# Patient Record
Sex: FEMALE | Race: BLACK OR AFRICAN AMERICAN | Employment: OTHER | URBAN - METROPOLITAN AREA
[De-identification: names, ages, dates, MRNs, and addresses within clinical notes are randomized per-mention and may not be internally consistent; named-entity substitution may affect disease eponyms.]

---

## 2017-01-26 ENCOUNTER — HOSPITAL ENCOUNTER (OUTPATIENT)
Dept: GENERAL RADIOLOGY | Age: 77
Discharge: HOME | End: 2017-01-26
Payer: MEDICARE

## 2017-01-26 DIAGNOSIS — R05.9 COUGH: ICD-10-CM

## 2017-01-26 PROCEDURE — 71020 XR CHEST PA LAT: CPT

## 2017-04-17 RX ORDER — OMEPRAZOLE 20 MG/1
20 CAPSULE, DELAYED RELEASE ORAL DAILY
COMMUNITY

## 2017-04-20 ENCOUNTER — ANESTHESIA EVENT (OUTPATIENT)
Dept: ENDOSCOPY | Age: 77
End: 2017-04-20
Payer: MEDICARE

## 2017-04-21 ENCOUNTER — HOSPITAL ENCOUNTER (OUTPATIENT)
Age: 77
Setting detail: OUTPATIENT SURGERY
Discharge: HOME | End: 2017-04-21
Attending: INTERNAL MEDICINE | Admitting: INTERNAL MEDICINE
Payer: MEDICARE

## 2017-04-21 ENCOUNTER — ANESTHESIA (OUTPATIENT)
Dept: ENDOSCOPY | Age: 77
End: 2017-04-21
Payer: MEDICARE

## 2017-04-21 VITALS
TEMPERATURE: 97.8 F | SYSTOLIC BLOOD PRESSURE: 107 MMHG | HEIGHT: 64 IN | HEART RATE: 62 BPM | DIASTOLIC BLOOD PRESSURE: 55 MMHG | WEIGHT: 162 LBS | BODY MASS INDEX: 27.66 KG/M2 | OXYGEN SATURATION: 95 % | RESPIRATION RATE: 16 BRPM

## 2017-04-21 PROCEDURE — 74011250636 HC RX REV CODE- 250/636

## 2017-04-21 PROCEDURE — 74011250636 HC RX REV CODE- 250/636: Performed by: NURSE ANESTHETIST, CERTIFIED REGISTERED

## 2017-04-21 PROCEDURE — 76060000031 HC ANESTHESIA FIRST 0.5 HR: Performed by: INTERNAL MEDICINE

## 2017-04-21 PROCEDURE — 77030031670 HC DEV INFL ENDOTEK BIG60 MRTM -B: Performed by: INTERNAL MEDICINE

## 2017-04-21 PROCEDURE — 76040000019: Performed by: INTERNAL MEDICINE

## 2017-04-21 RX ORDER — DEXTROMETHORPHAN/PSEUDOEPHED 2.5-7.5/.8
1.2 DROPS ORAL
Status: DISCONTINUED | OUTPATIENT
Start: 2017-04-21 | End: 2017-04-21 | Stop reason: HOSPADM

## 2017-04-21 RX ORDER — SODIUM CHLORIDE 0.9 % (FLUSH) 0.9 %
5-10 SYRINGE (ML) INJECTION AS NEEDED
Status: DISCONTINUED | OUTPATIENT
Start: 2017-04-21 | End: 2017-04-21 | Stop reason: HOSPADM

## 2017-04-21 RX ORDER — SODIUM CHLORIDE, SODIUM LACTATE, POTASSIUM CHLORIDE, CALCIUM CHLORIDE 600; 310; 30; 20 MG/100ML; MG/100ML; MG/100ML; MG/100ML
75 INJECTION, SOLUTION INTRAVENOUS CONTINUOUS
Status: DISCONTINUED | OUTPATIENT
Start: 2017-04-21 | End: 2017-04-21 | Stop reason: HOSPADM

## 2017-04-21 RX ORDER — SODIUM CHLORIDE 0.9 % (FLUSH) 0.9 %
5-10 SYRINGE (ML) INJECTION EVERY 8 HOURS
Status: DISCONTINUED | OUTPATIENT
Start: 2017-04-21 | End: 2017-04-21 | Stop reason: HOSPADM

## 2017-04-21 RX ORDER — LIDOCAINE HYDROCHLORIDE 10 MG/ML
0.1 INJECTION, SOLUTION EPIDURAL; INFILTRATION; INTRACAUDAL; PERINEURAL AS NEEDED
Status: DISCONTINUED | OUTPATIENT
Start: 2017-04-21 | End: 2017-04-21 | Stop reason: HOSPADM

## 2017-04-21 RX ORDER — PROPOFOL 10 MG/ML
INJECTION, EMULSION INTRAVENOUS AS NEEDED
Status: DISCONTINUED | OUTPATIENT
Start: 2017-04-21 | End: 2017-04-21 | Stop reason: HOSPADM

## 2017-04-21 RX ADMIN — PROPOFOL 50 MG: 10 INJECTION, EMULSION INTRAVENOUS at 09:11

## 2017-04-21 RX ADMIN — PROPOFOL 10 MG: 10 INJECTION, EMULSION INTRAVENOUS at 09:17

## 2017-04-21 RX ADMIN — PROPOFOL 50 MG: 10 INJECTION, EMULSION INTRAVENOUS at 09:10

## 2017-04-21 RX ADMIN — SODIUM CHLORIDE, SODIUM LACTATE, POTASSIUM CHLORIDE, AND CALCIUM CHLORIDE 75 ML/HR: 600; 310; 30; 20 INJECTION, SOLUTION INTRAVENOUS at 08:39

## 2017-04-21 NOTE — H&P
Date of Surgery Update:  Camryn Arriaga was seen and examined. History and physical has been reviewed. The patient has been examined.  There have been no significant clinical changes since the completion of the originally dated History and Physical.    Signed By: Sosa Taylor MD     April 21, 2017 7:54 AM

## 2017-04-21 NOTE — DISCHARGE INSTRUCTIONS
Patient Discharge Instructions    Aida Johnson / [id number] : 1940    Admitted 2017 Discharged: 2017         Procedure Impression:  1. Diverticulosis with no diverticulitis or bleeding. 2. Otherwise normal colonoscopy including terminal ileum. Recommendation:  1. Resume regular diet, recommend high fiber  2. Repeat colonoscopy in 10 years if appropriate based upon your health status at that time. Recommended Diet: Regular Diet    Recommended Activity:    1. Do not drink alcohol, drive or operate machinery for 12 hours   2. Call if any fever, abdominal pain or bleeding noted. Signed By: Pooja Dumont MD     2017            Colonoscopy: What to Expect at [address] [place] [address]  After you have a colonoscopy, you will stay at the clinic for 1 to 2 hours until the medicines wear off. Then you can go home. But you will need to arrange for a ride. Your doctor will tell you when you can eat and do your other usual activities. Your doctor will talk to you about when you will need your next colonoscopy. Your doctor can help you decide how often you need to be checked. This will depend on the results of your test and your risk for colorectal cancer. After the test, you may be bloated or have gas pains. You may need to pass gas. If a biopsy was done or a polyp was removed, you may have streaks of blood in your stool (feces) for a few days. This care sheet gives you a general idea about how long it will take for you to recover. But each person recovers at a different pace. Follow the steps below to get better as quickly as possible. How can you care for yourself at home? Activity  · Rest when you feel tired. · You can do your normal activities when it feels okay to do so. Diet  · Follow your doctor's directions for eating. · Unless your doctor has told you not to, drink plenty of fluids. This helps to replace the fluids that were lost during the colon prep.   · Do not drink alcohol. Medicines  · Your doctor will tell you if and when you can restart your medicines. He or she will also give you instructions about taking any new medicines. · If you take blood thinners, such as warfarin (Coumadin), clopidogrel (Plavix), or aspirin, be sure to talk to your doctor. He or she will tell you if and when to start taking those medicines again. Make sure that you understand exactly what your doctor wants you to do. · If polyps were removed or a biopsy was done during the test, your doctor may tell you not to take aspirin or other anti-inflammatory medicines for a few days. These include ibuprofen (Advil, Motrin) and naproxen (Aleve). Other instructions  · For your safety, do not drive or operate machinery until the medicine wears off and you can think clearly. Your doctor may tell you not to drive or operate machinery until the day after your test.  · Do not sign legal documents or make major decisions until the medicine wears off and you can think clearly. The anesthesia can make it hard for you to fully understand what you are agreeing to. Follow-up care is a key part of your treatment and safety. Be sure to make and go to all appointments, and call your doctor if you are having problems. It's also a good idea to know your test results and keep a list of the medicines you take. When should you call for help? Call 911 anytime you think you may need emergency care. For example, call if:  · You passed out (lost consciousness). · You pass maroon or bloody stools. · You have severe belly pain. Call your doctor now or seek immediate medical care if:  · Your stools are black and tarlike. · Your stools have streaks of blood, but you did not have a biopsy or any polyps removed. · You have belly pain, or your belly is swollen and firm. · You vomit. · You have a fever. · You are very dizzy.   Watch closely for changes in your health, and be sure to contact your doctor if you have any problems. Where can you learn more? Go to http://aruna-angelo.info/. Enter E264 in the search box to learn more about \"Colonoscopy: What to Expect at Home. \"  Current as of: August 9, 2016  Content Version: 11.2  © 9103-9728 [employer]. Care instructions adapted under license by [employer] (which disclaims liability or warranty for this information). If you have questions about a medical condition or this instruction, always ask your healthcare professional. [place] [address] any warranty or liability for your use of this information. DISCHARGE SUMMARY from Nurse    The following personal items are in your possession at time of discharge:    Dental Appliances: Uppers  Visual Aid: Glasses                            PATIENT INSTRUCTIONS:    After general anesthesia or intravenous sedation, for 24 hours or while taking prescription Narcotics:  · Limit your activities  · Do not drive and operate hazardous machinery  · Do not make important personal or business decisions  · Do  not drink alcoholic beverages  · If you have not urinated within 8 hours after discharge, please contact your surgeon on call. Report the following to your surgeon:  · Excessive pain, swelling, redness or odor of or around the surgical area  · Temperature over 100.5  · Nausea and vomiting lasting longer than 4 hours or if unable to take medications  · Any signs of decreased circulation or nerve impairment to extremity: change in color, persistent  numbness, tingling, coldness or increase pain  · Any questions        What to do at Home:  Recommended activity:        These are general instructions for a healthy lifestyle:    No smoking/ No tobacco products/ Avoid exposure to second hand smoke    Surgeon General's Warning:  Quitting smoking now greatly reduces serious risk to your health.     Obesity, smoking, and sedentary lifestyle greatly increases your risk for illness    A healthy diet, regular physical exercise & weight monitoring are important for maintaining a healthy lifestyle    You may be retaining fluid if you have a history of heart failure or if you experience any of the following symptoms:  Weight gain of 3 pounds or more overnight or 5 pounds in a week, increased swelling in our hands or feet or shortness of breath while lying flat in bed. Please call your doctor as soon as you notice any of these symptoms; do not wait until your next office visit. Recognize signs and symptoms of STROKE:    F-face looks uneven    A-arms unable to move or move unevenly    S-speech slurred or non-existent    T-time-call 911 as soon as signs and symptoms begin-DO NOT go       Back to bed or wait to see if you get better-TIME IS BRAIN. Warning Signs of HEART ATTACK     Call 911 if you have these symptoms:   Chest discomfort. Most heart attacks involve discomfort in the center of the chest that lasts more than a few minutes, or that goes away and comes back. It can feel like uncomfortable pressure, squeezing, fullness, or pain.  Discomfort in other areas of the upper body. Symptoms can include pain or discomfort in one or both arms, the back, neck, jaw, or stomach.  Shortness of breath with or without chest discomfort.  Other signs may include breaking out in a cold sweat, nausea, or lightheadedness. Don't wait more than five minutes to call 911 - MINUTES MATTER! Fast action can save your life. Calling 911 is almost always the fastest way to get lifesaving treatment. Emergency Medical Services staff can begin treatment when they arrive -- up to an hour sooner than if someone gets to the hospital by car. The discharge information has been reviewed with the caregiver. The caregiver verbalized understanding. Discharge medications reviewed with the caregiver and appropriate educational materials and side effects teaching were provided.       Patient armband removed and given to patient to take home.   Patient was informed of the privacy risks if armband lost or stolen

## 2017-04-21 NOTE — IP AVS SNAPSHOT
[address] [place] [address] [address] 
[phone] Patient: Sidney Tsai MRN: [id number] VB You are allergic to the following Allergen Reactions Neuromuscular Blockers, Steroidal Itching Recent Documentation Height Weight Breastfeeding? BMI OB Status Smoking Status 1.626 m 73.5 kg No 27.81 kg/m2 Hysterectomy Never Smoker Emergency Contacts Name Discharge Info Relation Home Work Mobile Stefanie Wang  Daughter [21]   [phone] Shweta Carlton-Daughter In Law DISCHARGE CAREGIVER [3] Other Relative [6]   [phone] About your hospitalization You were admitted on:  2017 You last received care in the:  [facility] PHASE 2 RECOVERY You were discharged on:  2017 Unit phone number:  [phone] Why you were hospitalized Your primary diagnosis was:  Not on File Providers Seen During Your Hospitalizations Provider Role Specialty Primary office phone Raymundo Tsang MD Attending Provider Gastroenterology [phone] Your Primary Care Physician (PCP) Primary Care Physician Office Phone Office Fax Martin Plascencia [phone] Follow-up Information Follow up With Details Comments Contact Info Mai Reeder MD   [address] [place] [address] 
[phone] Current Discharge Medication List  
  
CONTINUE these medications which have NOT CHANGED Dose & Instructions Dispensing Information Comments Morning Noon Evening Bedtime  
 aspirin 81 mg chewable tablet Your last dose was: Your next dose is:    
   
   
 Dose:  81 mg Take 81 mg by mouth daily. Refills:  0 BENICAR HCT 40-12.5 mg per tablet Generic drug:  olmesartan-hydroCHLOROthiazide Your last dose was: Your next dose is:    
   
   
  Refills:  0 cod liver oil Oil Your last dose was: Your next dose is: Take  by mouth. Refills:  0  
     
   
   
   
  
 multivitamin tablet Commonly known as:  ONE A DAY Your last dose was: Your next dose is:    
   
   
 Dose:  1 Tab Take 1 tablet by mouth daily. Refills:  0  
     
   
   
   
  
 omeprazole 20 mg capsule Commonly known as:  PRILOSEC Your last dose was: Your next dose is:    
   
   
 Dose:  20 mg Take 20 mg by mouth daily. Refills:  0  
     
   
   
   
  
 simvastatin 40 mg tablet Commonly known as:  ZOCOR Your last dose was: Your next dose is:    
   
   
  Refills:  0 Discharge Instructions Patient Discharge Instructions Roger Moser / [id number] : 1940 Admitted 2017 Discharged: 2017 Procedure Impression: 1. Diverticulosis with no diverticulitis or bleeding. 2. Otherwise normal colonoscopy including terminal ileum. Recommendation: 1. Resume regular diet, recommend high fiber 2. Repeat colonoscopy in 10 years if appropriate based upon your health status at that time. Recommended Diet: Regular Diet Recommended Activity: 1. Do not drink alcohol, drive or operate machinery for 12 hours 2. Call if any fever, abdominal pain or bleeding noted. Signed By: Deacon Powers MD   
 2017 Colonoscopy: What to Expect at [facility] Your Recovery After you have a colonoscopy, you will stay at the clinic for 1 to 2 hours until the medicines wear off. Then you can go home. But you will need to arrange for a ride. Your doctor will tell you when you can eat and do your other usual activities. Your doctor will talk to you about when you will need your next colonoscopy. Your doctor can help you decide how often you need to be checked.  This will depend on the results of your test and your risk for colorectal cancer. After the test, you may be bloated or have gas pains. You may need to pass gas. If a biopsy was done or a polyp was removed, you may have streaks of blood in your stool (feces) for a few days. This care sheet gives you a general idea about how long it will take for you to recover. But each person recovers at a different pace. Follow the steps below to get better as quickly as possible. How can you care for yourself at home? Activity · Rest when you feel tired. · You can do your normal activities when it feels okay to do so. Diet · Follow your doctor's directions for eating. · Unless your doctor has told you not to, drink plenty of fluids. This helps to replace the fluids that were lost during the colon prep. · Do not drink alcohol. Medicines · Your doctor will tell you if and when you can restart your medicines. He or she will also give you instructions about taking any new medicines. · If you take blood thinners, such as warfarin (Coumadin), clopidogrel (Plavix), or aspirin, be sure to talk to your doctor. He or she will tell you if and when to start taking those medicines again. Make sure that you understand exactly what your doctor wants you to do. · If polyps were removed or a biopsy was done during the test, your doctor may tell you not to take aspirin or other anti-inflammatory medicines for a few days. These include ibuprofen (Advil, Motrin) and naproxen (Aleve). Other instructions · For your safety, do not drive or operate machinery until the medicine wears off and you can think clearly. Your doctor may tell you not to drive or operate machinery until the day after your test. 
· Do not sign legal documents or make major decisions until the medicine wears off and you can think clearly. The anesthesia can make it hard for you to fully understand what you are agreeing to. Follow-up care is a key part of your treatment and safety.  Be sure to make and go to all appointments, and call your doctor if you are having problems. It's also a good idea to know your test results and keep a list of the medicines you take. When should you call for help? Call 911 anytime you think you may need emergency care. For example, call if: 
· You passed out (lost consciousness). · You pass maroon or bloody stools. · You have severe belly pain. Call your doctor now or seek immediate medical care if: 
· Your stools are black and tarlike. · Your stools have streaks of blood, but you did not have a biopsy or any polyps removed. · You have belly pain, or your belly is swollen and firm. · You vomit. · You have a fever. · You are very dizzy. Watch closely for changes in your health, and be sure to contact your doctor if you have any problems. Where can you learn more? Go to http://aruna-angelo.info/. Enter E264 in the search box to learn more about \"Colonoscopy: What to Expect at Home. \" Current as of: August 9, 2016 Content Version: 11.2 © 9873-1907 [employer]. Care instructions adapted under license by [employer] (which disclaims liability or warranty for this information). If you have questions about a medical condition or this instruction, always ask your healthcare professional. [place] [address] any warranty or liability for your use of this information. DISCHARGE SUMMARY from Nurse The following personal items are in your possession at time of discharge: 
 
Dental Appliances: Uppers Visual Aid: Glasses PATIENT INSTRUCTIONS: 
 
After general anesthesia or intravenous sedation, for 24 hours or while taking prescription Narcotics: · Limit your activities · Do not drive and operate hazardous machinery · Do not make important personal or business decisions · Do  not drink alcoholic beverages · If you have not urinated within 8 hours after discharge, please contact your surgeon on call. Report the following to your surgeon: 
· Excessive pain, swelling, redness or odor of or around the surgical area · Temperature over 100.5 · Nausea and vomiting lasting longer than 4 hours or if unable to take medications · Any signs of decreased circulation or nerve impairment to extremity: change in color, persistent  numbness, tingling, coldness or increase pain · Any questions What to do at Home: 
Recommended activity: 
 
 
 
These are general instructions for a healthy lifestyle: No smoking/ No tobacco products/ Avoid exposure to second hand smoke Surgeon General's Warning:  Quitting smoking now greatly reduces serious risk to your health. Obesity, smoking, and sedentary lifestyle greatly increases your risk for illness A healthy diet, regular physical exercise & weight monitoring are important for maintaining a healthy lifestyle You may be retaining fluid if you have a history of heart failure or if you experience any of the following symptoms:  Weight gain of 3 pounds or more overnight or 5 pounds in a week, increased swelling in our hands or feet or shortness of breath while lying flat in bed. Please call your doctor as soon as you notice any of these symptoms; do not wait until your next office visit. Recognize signs and symptoms of STROKE: 
 
F-face looks uneven A-arms unable to move or move unevenly S-speech slurred or non-existent T-time-call 911 as soon as signs and symptoms begin-DO NOT go Back to bed or wait to see if you get better-TIME IS BRAIN. Warning Signs of HEART ATTACK Call 911 if you have these symptoms: 
? Chest discomfort. Most heart attacks involve discomfort in the center of the chest that lasts more than a few minutes, or that goes away and comes back. It can feel like uncomfortable pressure, squeezing, fullness, or pain. ? Discomfort in other areas of the upper body.  Symptoms can include pain or discomfort in one or both arms, the back, neck, jaw, or stomach. ? Shortness of breath with or without chest discomfort. ? Other signs may include breaking out in a cold sweat, nausea, or lightheadedness. Don't wait more than five minutes to call [address]! Fast action can save your life. Calling 911 is almost always the fastest way to get lifesaving treatment. Emergency Medical Services staff can begin treatment when they arrive  up to an hour sooner than if someone gets to the hospital by car. The discharge information has been reviewed with the caregiver. The caregiver verbalized understanding. Discharge medications reviewed with the caregiver and appropriate educational materials and side effects teaching were provided. Patient armband removed and given to patient to take home. Patient was informed of the privacy risks if armband lost or stolen Discharge Orders None Introducing [facility] SERVICES! Benji Crandall introduces Work[employer] patient portal. Now you can access parts of your medical record, email your doctor's office, and request medication refills online. 1. In your internet browser, go to [email or web address] 2. Click on the First Time User? Click Here link in the Sign In box. You will see the New Member Sign Up page. 3. Enter your Work[employer] Access Code exactly as it appears below. You will not need to use this code after youve completed the sign-up process. If you do not sign up before the expiration date, you must request a new code. · Work[employer] Access Code: [email or web address] Expires: 4/26/2017  9:58 AM 
 
4. Enter the last four digits of your Social Security Number (xxxx) and Date of Birth (mm/dd/yyyy) as indicated and click Submit. You will be taken to the next sign-up page. 5. Create a WorkSnug ID. This will be your Work[employer] login ID and cannot be changed, so think of one that is secure and easy to remember. 6. Create a [employer] password. You can change your password at any time. 7. Enter your Password Reset Question and Answer. This can be used at a later time if you forget your password. 8. Enter your e-mail address. You will receive e-mail notification when new information is available in [address] 9. Click Sign Up. You can now view and download portions of your medical record. 10. Click the Download Summary menu link to download a portable copy of your medical information. If you have questions, please visit the Frequently Asked Questions section of the [employer] website. Remember, [employer] is NOT to be used for urgent needs. For medical emergencies, dial 911. Now available from your iPhone and Android! General Information Please provide this summary of care documentation to your next provider. Patient Signature:  ____________________________________________________________ Date:  ____________________________________________________________  
  
Reba Desouza Provider Signature:  ____________________________________________________________ Date:  ____________________________________________________________

## 2017-04-21 NOTE — ANESTHESIA POSTPROCEDURE EVALUATION
Post-Anesthesia Evaluation & Assessment    Visit Vitals    BP (!) 84/51    Pulse 72    Temp 36.6 °C (97.8 °F)    Resp 16    Ht 5' 4\" (1.626 m)    Wt 73.5 kg (162 lb)    SpO2 94%    Breastfeeding No    BMI 27.81 kg/m2       Nausea/Vomiting: no nausea and no vomiting    Post-operative hydration adequate. Pain score (VAS): 0    Mental status & Level of consciousness: alert and oriented x 3    Neurological status: moves all extremities, sensation grossly intact    Pulmonary status: airway patent, no supplemental oxygen required    Complications related to anesthesia: none    Patient has met all discharge requirements.     Additional comments:        Michelle Moss CRNA

## 2017-04-21 NOTE — ANESTHESIA PREPROCEDURE EVALUATION
Anesthetic History   No history of anesthetic complications            Review of Systems / Medical History  Patient summary reviewed and pertinent labs reviewed    Pulmonary  Within defined limits                 Neuro/Psych   Within defined limits           Cardiovascular    Hypertension              Exercise tolerance: >4 METS     GI/Hepatic/Renal     GERD: well controlled           Endo/Other        Arthritis     Other Findings   Comments:   Risk Factors for Postoperative nausea/vomiting:       History of postoperative nausea/vomiting? NO       Female? YES       Motion sickness? NO       Intended opioid administration for postoperative analgesia?   NO           Physical Exam    Airway  Mallampati: II  TM Distance: 4 - 6 cm  Neck ROM: normal range of motion   Mouth opening: Normal     Cardiovascular               Dental  No notable dental hx       Pulmonary  Breath sounds clear to auscultation               Abdominal  GI exam deferred       Other Findings            Anesthetic Plan    ASA: 2  Anesthesia type: MAC          Induction: Intravenous  Anesthetic plan and risks discussed with: Patient

## 2017-04-21 NOTE — PROCEDURES
Colonoscopy Report    Patient: Kari Still MRN: [id number]  SSN: [id number]    YOB: 1940  Age: 65 y.o. Sex: female      Date of Procedure: 4/21/2017    IMPRESSION:  1. Diverticulosis with no diverticulitis or bleeding. 2. Otherwise normal colonoscopy including terminal ileum. RECOMMENDATIONS:  1. Resume regular diet, Recommend high fiber. 2. Repeat colonoscopy in 10 years if appropriate based upon health status at that time. Indication:  Screening colonoscopy  Procedure Performed: Colonoscopy   Endoscopist: Filomena Trujillo MD  Assistant: Endoscopy Technician-1: Gonzalo Champion  Endoscopy RN-1: Poppy Bourgeois RN  ASA: ASA 3 - Patient with moderate systemic disease with functional limitations  Mallampati Score: II (soft palate, uvula, fauces visible)  Anesthesia: MAC anesthesia  Endoscope:     [x]  CF H 190AL   []  PCF H190AL   []  GIF H 190    Extent of Examination:terminal ileum  Quality of Preparation:     []  Excellent   [x]  Very Good   [] Fair but adequate   [] Fair, inadequate   []  Poor      Technique: The procedure was discussed with the patient including risks, benefits, alternatives including risks of IV sedation, bleeding, perforation and missed polyp. A safety timeout was performed. The patient was given incremental doses of intravenous sedation to achieve moderate sedation. The patients vital signs were monitored at all times including heart rate, rhythm, blood pressure and oxygen saturation. The patient was placed in left lateral position. When adequate sedation was achieved a perianal inspection and a digital rectal exam were performed. Video colonoscope was introduced into the rectum and advanced under direct vision up to the terminal ileum. The cecum was identified by IC valve, appendiceal orifice and crows foot. With adequate insufflation and maneuvering of the withdrawing scope, the colonic mucosa was visualized carefully.  Retroflexion was performed in the rectum and the distal rectum visualized. The patient tolerated the procedure very well and was transferred to recovery area. Findings:  1. Normal rectal exam.   2. Normal terminal ileum with no ulceration, mass, stricture. 3. There were several small diverticula in the descending and sigmoid colon. No associated inflammation or bleeding. 4. The colonic mucosa was otherwise normal with no polyps, masses, ulcerations, strictures.        EBL:None  Specimen: * No specimens in log *    Gala Porter MD  April 21, 2017  9:25 AM

## 2017-12-20 ENCOUNTER — OFFICE VISIT (OUTPATIENT)
Dept: ORTHOPEDIC SURGERY | Age: 77
End: 2017-12-20

## 2017-12-20 VITALS
HEART RATE: 87 BPM | HEIGHT: 64 IN | SYSTOLIC BLOOD PRESSURE: 116 MMHG | OXYGEN SATURATION: 98 % | BODY MASS INDEX: 28.2 KG/M2 | TEMPERATURE: 98.1 F | WEIGHT: 165.2 LBS | DIASTOLIC BLOOD PRESSURE: 47 MMHG | RESPIRATION RATE: 16 BRPM

## 2017-12-20 DIAGNOSIS — M47.816 LUMBAR SPONDYLOSIS: Primary | ICD-10-CM

## 2017-12-20 DIAGNOSIS — M48.061 LUMBAR STENOSIS WITHOUT NEUROGENIC CLAUDICATION: ICD-10-CM

## 2017-12-20 PROBLEM — M51.36 DDD (DEGENERATIVE DISC DISEASE), LUMBAR: Status: ACTIVE | Noted: 2017-12-20

## 2017-12-20 RX ORDER — RANITIDINE 300 MG/1
TABLET ORAL
Refills: 0 | COMMUNITY
Start: 2017-11-06

## 2017-12-20 NOTE — PROGRESS NOTES
[address]  [address], [address]  [place]  Phone: [phone]  Fax: [phone]        Mahin Mckeon  : 1940  PCP: Cayetano Westbrook MD      NEW PATIENT      ASSESSMENT AND PLAN     Diagnoses and all orders for this visit:    1. Lumbar spondylosis  -     REFERRAL TO PHYSICAL THERAPY    2. Lumbar stenosis without neurogenic claudication  -     REFERRAL TO PHYSICAL THERAPY    1. Advised to stay active as tolerated. 2. Medications, injections, and physical therapy discussed as possible treatment options. 3. Referral to physical therapy. 4. Avoid any repetitive BLT. Follow-up Disposition:  Return in about 6 weeks (around 2018). CHIEF COMPLAINT  Bonita Leyva is seen today in consultation at the request of Dr. Rosana Morrow for complaints of back pain. HISTORY OF PRESENT ILLNESS  Bonita Leyva is a 65 y.o. female. Today pt c/o low back pain of 2 years duration. Pt denies any specific incident or injury that caused their pain. She states that she has had pain in her back for more than two years but has been increasing in severity for the past 2 years. Her pain is aggravated with vacuuming, sweeping, mopping, and other house chores. Pt reports pain does not radiate into BLE. She has had an episode of pain in her buttocks. Bending, pushing, pulling, and activity flares her pain. She has no pain with sitting. Pt denies weakness in BLe. Pt denies saddle paresthesias. Pt states she has used Aleve PRN with relief. she has tried; PT-No,  Non-opioid medications Yes, and spinal injections No.  Denies persistent fevers, chills, weight changes, neurogenic bowel or bladder symptoms. Pt denies recent ED visits or hospitalizations. PMHx of GERD. MRI + stenosis and possible left sacroiliitis.  Pt denies focal L sided LBP, falls, infection    Pain Assessment  2017   Location of Pain Back   Severity of Pain 7   Quality of Pain Aching   Frequency of Pain Constant   Aggravating Factors (No Data)   Aggravating Factors Comment household dddutiess   Relieving Factors Rest     Lumbar spine MRI from 11/1/17 reviewed:      Result Impression   Impression:    1. Degenerative disease at L4-L5 and L5-S1, with central stenosis , foraminal stenosis, spondylolisthesis and bilateral exiting L4 and L5 nerve root compression. 2. Questionable sacroiliitis, nonspecific or indeterminate age fracture through the left iliac wing not completely excluded. Clinical correlation? PAST MEDICAL HISTORY   Past Medical History:   Diagnosis Date    Arthritis     Chronic pain     LOWE BACK    GERD (gastroesophageal reflux disease)     Hypercholesterolemia     Hypertension        Past Surgical History:   Procedure Laterality Date    COLONOSCOPY N/A 4/21/2017    [facility]  2004    right     HX HYSTERECTOMY  1984       MEDICATIONS      Current Outpatient Prescriptions   Medication Sig Dispense Refill    raNITIdine (ZANTAC) 300 mg tablet TAKE 1 TABLET BY MOUTH AT BEDTIME  0    omeprazole (PRILOSEC) 20 mg capsule Take 20 mg by mouth daily.  simvastatin (ZOCOR) 40 mg tablet       multivitamin (ONE A DAY) tablet Take 1 tablet by mouth daily.  aspirin 81 mg chewable tablet Take 81 mg by mouth daily.  cod liver oil oil Take  by mouth.  BENICAR HCT 40-12.5 mg per tablet          ALLERGIES    Allergies   Allergen Reactions    Neuromuscular Blockers, Steroidal Itching          SOCIAL HISTORY    Social History     Social History    Marital status: SINGLE     Spouse name: N/A    Number of children: N/A    Years of education: N/A     Occupational History    Not on file.      Social History Main Topics    Smoking status: Never Smoker    Smokeless tobacco: Never Used    Alcohol use No    Drug use: No    Sexual activity: Not on file     Other Topics Concern    Not on file     Social History Narrative       FAMILY HISTORY    Family History   Problem Relation Age of Onset    Heart Attack Mother     Cancer Father          REVIEW OF SYSTEMS  Review of Systems   Constitutional: Negative for chills, fever and weight loss. Respiratory: Negative for shortness of breath. Cardiovascular: Negative for chest pain. Gastrointestinal: Negative for constipation. Negative for fecal incontinence   Genitourinary: Negative for dysuria. Negative for urinary incontinence   Musculoskeletal:        Per HPI   Skin: Negative for rash. Neurological: Negative for dizziness, tingling, tremors, focal weakness and headaches. Endo/Heme/Allergies: Does not bruise/bleed easily. Psychiatric/Behavioral: The patient does not have insomnia. PHYSICAL EXAMINATION  Visit Vitals    /47    Pulse 87    Temp 98.1 °F (36.7 °C) (Oral)    Resp 16    Ht 5' 4\" (1.626 m)    Wt 165 lb 3.2 oz (74.9 kg)    SpO2 98%    BMI 28.36 kg/m2          Accompanied by self. Constitutional:  Well developed, well nourished, in no acute distress. Psychiatric: Affect and mood are appropriate. Integumentary: No rashes or abrasions noted on exposed areas. Cardiovascular/Peripheral Vascular: Intact l pulses. No peripheral edema is noted. Lymphatic:  No evidence of lymphedema. No cervical lymphadenopathy. SPINE/MUSCULOSKELETAL EXAM    Lumbar spine:  No rash, ecchymosis, or gross obliquity. No fasciculations. No focal atrophy is noted. Good ROM. No pain with hip ROM. Lumbar region no TTP. No tenderness to palpation at the sciatic notch. SI joints non-tender. Trochanters non tender. MOTOR:         Hip Flex  Quads Hamstrings Ankle DF EHL Ankle PF   Right +4/5 +4/5 +4/5 +4/5 +4/5 +4/5   Left +4/5 +4/5 +4/5 +4/5 +4/5 +4/5     Straight Leg raise negative. Unable to do tandem gait. Mild back pain with Heel rise. Toe rise intact    Ambulation without assistive device. FWB.    Written by Rosina Soria, as dictated by Angelica Kennedy MD.    I, Dr. Angelica Kennedy MD, confirm that all documentation is accurate. Ms. Mayra Cowden may have a reminder for a \"due or due soon\" health maintenance. I have asked that she contact her primary care provider for follow-up on this health maintenance.

## 2017-12-20 NOTE — PATIENT INSTRUCTIONS
Back Care and Preventing Injuries: Care Instructions  Your Care Instructions    You can hurt your back doing many everyday activities: lifting a heavy box, bending down to garden, exercising at the gym, and even getting out of bed. But you can keep your back strong and healthy by doing some exercises. You also can follow a few tips for sitting, sleeping, and lifting to avoid hurting your back again. Talk to your doctor before you start an exercise program. Ask for help if you want to learn more about keeping your back healthy. Follow-up care is a key part of your treatment and safety. Be sure to make and go to all appointments, and call your doctor if you are having problems. It's also a good idea to know your test results and keep a list of the medicines you take. How can you care for yourself at home? · Stay at a healthy weight to avoid strain on your lower back. · Do not smoke. Smoking increases the risk of osteoporosis, which weakens the spine. If you need help quitting, talk to your doctor about stop-smoking programs and medicines. These can increase your chances of quitting for good. · Make sure you sleep in a position that maintains your back's normal curves and on a mattress that feels comfortable. Sleep on your side with a pillow between your knees, or sleep on your back with a pillow under your knees. These positions can reduce strain on your back. · When you get out of bed, lie on your side and bend both knees. Drop your feet over the edge of the bed as you push up with both arms. Scoot to the edge of the bed. Make sure your feet are in line with your rear end (buttocks), and then stand up. · If you must stand for a long time, put one foot on a stool, ledge, or box. Exercise to strengthen your back and other muscles  · Get at least 30 minutes of exercise on most days of the week. Walking is a good choice.  You also may want to do other activities, such as running, swimming, cycling, or playing tennis or team sports. · Stretch your back muscles. Here are few exercises to try:  Hugo Valerio on your back with your knees bent and your feet flat on the floor. Gently pull one bent knee to your chest. Put that foot back on the floor, and then pull the other knee to your chest. Hold for 15 to 30 seconds. Repeat 2 to 4 times. ¨ Do pelvic tilts. Lie on your back with your knees bent. Tighten your stomach muscles. Pull your belly button (navel) in and up toward your ribs. You should feel like your back is pressing to the floor and your hips and pelvis are slightly lifting off the floor. Hold for 6 seconds while breathing smoothly. · Keep your core muscles strong. The muscles of your back, belly (abdomen), and buttocks support your spine. ¨ Pull in your belly, and imagine pulling your navel toward your spine. Hold this for 6 seconds, then relax. Remember to keep breathing normally as you tense your muscles. ¨ Do curl-ups. Always do them with your knees bent. Keep your low back on the floor, and curl your shoulders toward your knees using a smooth, slow motion. Keep your arms folded across your chest. If this bothers your neck, try putting your hands behind your neck (not your head), with your elbows spread apart. ¨ Lie on your back with your knees bent and your feet flat on the floor. Tighten your belly muscles, and then push with your feet and raise your buttocks up a few inches. Hold this position 6 seconds as you continue to breathe normally, then lower yourself slowly to the floor. Repeat 8 to 12 times. ¨ If you like group exercise, try Pilates or yoga. These classes have poses that strengthen the core muscles. Protect your back when you sit  · Place a small pillow, a rolled-up towel, or a lumbar roll in the curve of your back if you need extra support. · Sit in a chair that is low enough to let you place both feet flat on the floor with both knees nearly level with your hips.  If your chair or desk is too high, use a foot rest to raise your knees. · When driving, keep your knees nearly level with your hips. Sit straight, and drive with both hands on the steering wheel. Your arms should be in a slightly bent position. · Try a kneeling chair, which helps tilt your hips forward. This takes pressure off your lower back. · Try sitting on an exercise ball. It can rock from side to side, which helps keep your back loose. Lift properly  · Squat down, bending at the hips and knees only. If you need to, put one knee to the floor and extend your other knee in front of you, bent at a right angle (half kneeling). · Press your chest straight forward. This helps keep your upper back straight while keeping a slight arch in your low back. · Hold the load as close to your body as possible, at the level of your navel. · Use your feet to change direction, taking small steps. · Lead with your hips as you change direction. Keep your shoulders in line with your hips as you move. Do not twist your body. · Set down your load carefully, squatting with your knees and hips only. When should you call for help? Watch closely for changes in your health, and be sure to contact your doctor if you have any problems. Where can you learn more? Go to http://aruna-angelo.info/. Enter S810 in the search box to learn more about \"Back Care and Preventing Injuries: Care Instructions. \"  Current as of: March 21, 2017  Content Version: 11.4  © 5363-0378 [employer]. Care instructions adapted under license by [employer] (which disclaims liability or warranty for this information). If you have questions about a medical condition or this instruction, always ask your healthcare professional. [place] [address] any warranty or liability for your use of this information.

## 2017-12-20 NOTE — MR AVS SNAPSHOT
Visit Information Date & Time Provider Department Dept. Phone Encounter #  
 12/20/2017  1:30  [address],  [facility], [address] and [facility] [phone] [id number] Follow-up Instructions Return in about 6 weeks (around 1/31/2018). Upcoming Health Maintenance Date Due DTaP/Tdap/Td series (1 - Tdap) 4/11/1960 ZOSTER VACCINE AGE 60> 2/11/1999 GLAUCOMA SCREENING Q2Y 4/11/2004 Pneumococcal 65+ Low/Medium Risk (1 of 2 - PCV13) 4/11/2004 MEDICARE YEARLY EXAM 4/11/2004 Influenza Age 5 to Adult 8/1/2017 Allergies as of 12/20/2017  Review Complete On: 12/20/2017 By: Sangita Cobb LPN Severity Noted Reaction Type Reactions Neuromuscular Blockers, Steroidal  05/28/2014    Itching Current Immunizations  Never Reviewed No immunizations on file. Not reviewed this visit You Were Diagnosed With   
  
 Codes Comments Lumbar spondylosis    -  Primary ICD-10-CM: T25.550 ICD-9-CM: 721.3 DDD (degenerative disc disease), lumbar     ICD-10-CM: M51.36 
ICD-9-CM: 722.52 Vitals BP Pulse Temp Resp Height(growth percentile) Weight(growth percentile) 116/47 87 98.1 °F (36.7 °C) (Oral) 16 5' 4\" (1.626 m) 165 lb 3.2 oz (74.9 kg) SpO2 BMI OB Status Smoking Status 98% 28.36 kg/m2 Hysterectomy Never Smoker BMI and BSA Data Body Mass Index Body Surface Area  
 28.36 kg/m 2 1.84 m 2 Your Updated Medication List  
  
   
This list is accurate as of: 12/20/17  2:05 PM.  Always use your most recent med list.  
  
  
  
  
 aspirin 81 mg chewable tablet Take 81 mg by mouth daily. BENICAR HCT 40-12.5 mg per tablet Generic drug:  olmesartan-hydroCHLOROthiazide  
  
 cod liver oil Oil Take  by mouth.  
  
 multivitamin tablet Commonly known as:  ONE A DAY Take 1 tablet by mouth daily. omeprazole 20 mg capsule Commonly known as:  PRILOSEC Take 20 mg by mouth daily. raNITIdine 300 mg tablet Commonly known as:  ZANTAC TAKE 1 TABLET BY MOUTH AT BEDTIME  
  
 simvastatin 40 mg tablet Commonly known as:  ZOCOR We Performed the Following REFERRAL TO PHYSICAL THERAPY [RGJ53 Custom] Comments: In [place] Follow-up Instructions Return in about 6 weeks (around 1/31/2018). Referral Information Referral ID Referred By Referred To  
  
 [id number] Kris Godoy Not Available Visits Status Start Date End Date 1 New Request 12/20/17 12/20/18 If your referral has a status of pending review or denied, additional information will be sent to support the outcome of this decision. Patient Instructions Back Care and Preventing Injuries: Care Instructions Your Care Instructions You can hurt your back doing many everyday activities: lifting a heavy box, bending down to garden, exercising at the gym, and even getting out of bed. But you can keep your back strong and healthy by doing some exercises. You also can follow a few tips for sitting, sleeping, and lifting to avoid hurting your back again. Talk to your doctor before you start an exercise program. Ask for help if you want to learn more about keeping your back healthy. Follow-up care is a key part of your treatment and safety. Be sure to make and go to all appointments, and call your doctor if you are having problems. It's also a good idea to know your test results and keep a list of the medicines you take. How can you care for yourself at home? · Stay at a healthy weight to avoid strain on your lower back. · Do not smoke. Smoking increases the risk of osteoporosis, which weakens the spine. If you need help quitting, talk to your doctor about stop-smoking programs and medicines. These can increase your chances of quitting for good.  
· Make sure you sleep in a position that maintains your back's normal curves and on a mattress that feels comfortable. Sleep on your side with a pillow between your knees, or sleep on your back with a pillow under your knees. These positions can reduce strain on your back. · When you get out of bed, lie on your side and bend both knees. Drop your feet over the edge of the bed as you push up with both arms. Scoot to the edge of the bed. Make sure your feet are in line with your rear end (buttocks), and then stand up. · If you must stand for a long time, put one foot on a stool, ledge, or box. Exercise to strengthen your back and other muscles · Get at least 30 minutes of exercise on most days of the week. Walking is a good choice. You also may want to do other activities, such as running, swimming, cycling, or playing tennis or team sports. · Stretch your back muscles. Here are few exercises to try: ¨ Lie on your back with your knees bent and your feet flat on the floor. Gently pull one bent knee to your chest. Put that foot back on the floor, and then pull the other knee to your chest. Hold for 15 to 30 seconds. Repeat 2 to 4 times. ¨ Do pelvic tilts. Lie on your back with your knees bent. Tighten your stomach muscles. Pull your belly button (navel) in and up toward your ribs. You should feel like your back is pressing to the floor and your hips and pelvis are slightly lifting off the floor. Hold for 6 seconds while breathing smoothly. · Keep your core muscles strong. The muscles of your back, belly (abdomen), and buttocks support your spine. ¨ Pull in your belly, and imagine pulling your navel toward your spine. Hold this for 6 seconds, then relax. Remember to keep breathing normally as you tense your muscles. ¨ Do curl-ups. Always do them with your knees bent. Keep your low back on the floor, and curl your shoulders toward your knees using a smooth, slow motion.  Keep your arms folded across your chest. If this bothers your neck, try putting your hands behind your neck (not your head), with your elbows spread apart. ¨ Lie on your back with your knees bent and your feet flat on the floor. Tighten your belly muscles, and then push with your feet and raise your buttocks up a few inches. Hold this position 6 seconds as you continue to breathe normally, then lower yourself slowly to the floor. Repeat 8 to 12 times. ¨ If you like group exercise, try Pilates or yoga. These classes have poses that strengthen the core muscles. Protect your back when you sit · Place a small pillow, a rolled-up towel, or a lumbar roll in the curve of your back if you need extra support. · Sit in a chair that is low enough to let you place both feet flat on the floor with both knees nearly level with your hips. If your chair or desk is too high, use a foot rest to raise your knees. · When driving, keep your knees nearly level with your hips. Sit straight, and drive with both hands on the steering wheel. Your arms should be in a slightly bent position. · Try a kneeling chair, which helps tilt your hips forward. This takes pressure off your lower back. · Try sitting on an exercise ball. It can rock from side to side, which helps keep your back loose. Lift properly · Squat down, bending at the hips and knees only. If you need to, put one knee to the floor and extend your other knee in front of you, bent at a right angle (half kneeling). · Press your chest straight forward. This helps keep your upper back straight while keeping a slight arch in your low back. · Hold the load as close to your body as possible, at the level of your navel. · Use your feet to change direction, taking small steps. · Lead with your hips as you change direction. Keep your shoulders in line with your hips as you move. Do not twist your body. · Set down your load carefully, squatting with your knees and hips only. When should you call for help? Watch closely for changes in your health, and be sure to contact your doctor if you have any problems. Where can you learn more? Go to http://aruna-angelo.info/. Enter S810 in the search box to learn more about \"Back Care and Preventing Injuries: Care Instructions. \" Current as of: March 21, 2017 Content Version: 11.4 © 0416-5768 [employer]. Care instructions adapted under license by [employer] (which disclaims liability or warranty for this information). If you have questions about a medical condition or this instruction, always ask your healthcare professional. [address] any warranty or liability for your use of this information. Introducing \[facility] [place] [facility]\"" & HEALTH SERVICES! Kosta Gomez introduces [employer] patient portal. Now you can access parts of your medical record, email your doctor's office, and request medication refills online. 1. In your internet browser, go to [email or web address] [email or web address] 2. Click on the First Time User? Click Here link in the Sign In box. You will see the New Member Sign Up page. 3. Enter your [employer] Access Code exactly as it appears below. You will not need to use this code after youve completed the sign-up process. If you do not sign up before the expiration date, you must request a new code. · [employer] Access Code: [email or web address] Expires: 3/20/2018  2:05 PM 
 
4. Enter the last four digits of your Social Security Number (xxxx) and Date of Birth (mm/dd/yyyy) as indicated and click Submit. You will be taken to the next sign-up page. 5. Create a Eq[employer]xt ID. This will be your [employer] login ID and cannot be changed, so think of one that is secure and easy to remember. 6. Create a [employer] password. You can change your password at any time. 7. Enter your Password Reset Question and Answer. This can be used at a later time if you forget your password. 8. Enter your e-mail address. You will receive e-mail notification when new information is available in [address] 9. Click Sign Up. You can now view and download portions of your medical record. 10. Click the Download Summary menu link to download a portable copy of your medical information. If you have questions, please visit the Frequently Asked Questions section of the [facility] website. Remember, [facility] is NOT to be used for urgent needs. For medical emergencies, dial 911. Now available from your iPhone and Android! Please provide this summary of care documentation to your next provider. Your primary care clinician is listed as Paz Mcelroy. If you have any questions after today's visit, please call [phone].

## 2018-01-11 ENCOUNTER — HOSPITAL ENCOUNTER (OUTPATIENT)
Dept: PHYSICAL THERAPY | Age: 78
End: 2018-01-11
Payer: MEDICARE

## 2018-01-16 ENCOUNTER — HOSPITAL ENCOUNTER (OUTPATIENT)
Dept: PHYSICAL THERAPY | Age: 78
Discharge: HOME | End: 2018-01-16
Payer: MEDICARE

## 2018-01-16 PROCEDURE — 97162 PT EVAL MOD COMPLEX 30 MIN: CPT

## 2018-01-16 PROCEDURE — G8978 MOBILITY CURRENT STATUS: HCPCS

## 2018-01-16 PROCEDURE — 97110 THERAPEUTIC EXERCISES: CPT

## 2018-01-16 PROCEDURE — G8979 MOBILITY GOAL STATUS: HCPCS

## 2018-01-16 NOTE — PROGRESS NOTES
PHYSICAL THERAPY - DAILY TREATMENT NOTE    Patient Name: Kita Baltazar        Date: 2018  : 1940   YES Patient  Verified  Visit #:   1     Insurance: Payor: Kateryna  / Plan: [address] [place] [address] HMO / Product Type: Managed Care Medicare /      In time: 2:23 pm Out time: 3:05pm   Total Treatment Time: 42     Medicare Time Tracking (below)   Total Timed Codes (min):  12 1:1 Treatment Time:  12     TREATMENT AREA =  Lumbar spondylosis [M47.816]  Spinal stenosis, lumbar region without neurogenic claudication [M48.061]  SUBJECTIVE  Pain Level (on 0 to 10 scale):  3  / 10   Medication Changes/New allergies or changes in medical history, any new surgeries or procedures? NO    If yes, update Summary List   Subjective Functional Status/Changes:  []  No changes reported     See POC         OBJECTIVE  Modalities Rationale:     N/A    12 min Therapeutic Exercise:  [x]  See flow sheet FOTO Questionnaire    Rationale:      increase ROM and increase strength to improve the patients ability to perform ADLs. min Patient Education:  YES  Reviewed HEP   []  Progressed/Changed HEP based on: Other Objective/Functional Measures:   Physical Therapy Evaluation - Lumbar Spine ([facility])    SUBJECTIVE  Aggravated by:   [x] Bending [x] Sitting [] Standing [x] Walking (45 min)   [x] Moving [] Cough [] Sneeze [] Valsalva   [] AM  [] PM  Lying:  [x] sup   [] pro   [] sidelying   [] Other: Incr LBP with skipping bowel movement, prolonged driving     Eased by:    [] Bending [] Sitting [] Standing [] Walking   [] Moving [] AM  [] PM  Lying: [] sup  [] pro  [x] sidelying   [] Other:     General Health:  Red Flags Indicated? [] Yes    [] No  [] Yes [x] No Recent weight change (If yes, due to dieting?  [] Yes  [] No)   [] Yes [x] No Weakness in legs during walking  [] Yes [x] No Unremitting pain at night  [] Yes [x] No Abdominal pain or problems  [] Yes [x] No Rectal bleeding  [] Yes [x] No Blood or pain with urination  [] Yes [x] No Dysfunction of bowel or bladder  [] Yes [x] No Numbness/tingling in buttock/genitalia region    Past History/Treatments:     Diagnostic Tests: [] Lab work [x] X-rays    [] CT [x] MRI     [] Other:  Results: DDD and arthritis    OBJECTIVE  Posture:  Lateral Shift: [] R    [] L     [] +  [x] -  Kyphosis: [] Increased [x] Decreased   []  WNL  Lordosis:  [] Increased [] Decreased   [x] WNL    Active Movements: [] N/A   [] Too acute   [] Other:  ROM % AROM % PROM Comments:pain, area   Forward flexion 40-60 100%     Extension 20-30 50%  L LBP   SB right 20-30 75% jt line    SB left 20-30 75% jt line L LBP   Rotation right 5-10 75%  R LBP   Rotation left 5-10 100%       Neuro Screen [] WNL  Myotome/Dermatome/Reflexes: L2-S2 TAMY LE light touch sensation WNLs  Comments:    Dural Mobility:  SLR Supine: [] R    [] L    [] +    [x] -  @ (degrees):   Slump Test: [x] R    [x] L    [] +    [x] -  @ (degrees):   Prone Knee Bend: [] R    [] L    [] +    [x] -     Palpation  [x] Min  [] Mod  [] Severe    Location: L/S Paraspinals  [x] Min  [] Mod  [] Severe    Location: Quadratus Lumborum  [] Min  [x] Mod  [] Severe    Location: Piriformis    Strength   L(0-5) R (0-5) N/T   Psoas  (L1,2) 4 4+ []   Quadriceps (L3,4) 4 4- []   Ant Tibialis (L4) 5- 4+ []   Extensor Hallicus (L5) 5- 5    Gluteus Medius (L5) 2+ 3 []   Gastrocnemius (S1, S2) 3+ 3+ []   Hamstring (S1,2) 5- 5- []   Gluteus Adam (S1, S2) 3+ 3+ []   Supine Bridge 50% Fair []     Special Tests    Sacroilliac:  Distraction: [] R    [] L    [] +    [x] -     Compression: [] +    [x] -     Thigh Thrust: [] R    [] L    [] +    [x] -     Leg Length: [x] +    [] -   Position: R Ant     Mobility: Standing flex: (+R)     Supine to sit: R longer         Hip: Sangeeta Julio:  [x] R    [] L    [x] +    [] -     Piriformis: [x] R    [] L    [x] +    [] -          Deficits: Sylvia's: [] R    [] L    [] +    [x] -     Bonifacio: [] R    [] L    [] +    [x] - Hamstrings 90/90: L mod R slight to mod    Gastrocsoleus (to neutral): Right: neutral Left: < neutral    Other tests/comments: L4/5 hypermobility  Justification for Eval Complexity:   Patient History: MEDIUM  Complexity : 1-2 comorbidities / personal factors will impact the outcome/ POC  (HTN)  Examination:HIGH Complexity : 4+ Standardized tests and measures addressing body structure, function, activity limitation and / or participation in recreation  (See POC and musculoskeletal examination attached)  Clinical Presentation: MEDIUM Complexity : Evolving with changing characteristics  (pain level 7-8/10 on average and 10/10 @ worst, intermittent pain)  Clinical Decision Making:MEDIUM Complexity : FOTO score of 26-74 (Foto 55/100)  Overall Complexity:MEDIUM     Post Treatment Pain Level (on 0 to 10) scale:   0  / 10     ASSESSMENT  Assessment/Changes in Function:     See POC     []  See Progress Note/Recertification   Patient will continue to benefit from skilled PT services to modify and progress therapeutic interventions, address functional mobility deficits, address ROM deficits, address strength deficits, analyze and address soft tissue restrictions, analyze and cue movement patterns, analyze and modify body mechanics/ergonomics and assess and modify postural abnormalities to attain remaining goals.    Progress toward goals / Updated goals:    See POC     PLAN  [x]  Upgrade activities as tolerated YES Continue plan of care   []  Discharge due to :    []  Other:      Therapist: Dave Latif DPT     Date: 1/16/2018 Time: 8:57 AM     Future Appointments  Date Time Provider Orlin Ferrara   1/16/2018 2:30 PM [address]   1/31/2018 10:45 AM Barb Ramirez  [id number]

## 2018-01-16 NOTE — PROGRESS NOTES
[place] [address]  [facility] AT [address], [facility] [address] [facility], [facility] [place] [address] - Phone: [phone]  Fax: [phone] / [address] [facility]  Patient Name: Maureen Portillo : 1940   Medical   Diagnosis: Lumbar spondylosis [M47.816]  Spinal stenosis, lumbar region without neurogenic claudication [M48.061] Treatment Diagnosis: Lumbar spondylosis   Spinal stenosis, lumbar region without neurogenic claudication   Onset Date:      Referral Source: Dulce Mai MD [facility]): 2018   Prior Hospitalization: See medical history Provider #: [id number]   Prior Level of Function: Occasional LBP with forward bending   Comorbidities: HTN, Hypercholesterolemia, Acid Reflux, and Cataracts   Medications: Verified on Patient Summary List   The Plan of Care and following information is based on the information from the initial evaluation.   ==================================================================================  Assessment / key information: Patient  is a 65 y.o. female who [facility] to [facility] with diagnosis of Lumbar spondylosis [M47.816]  Spinal stenosis, lumbar region without neurogenic claudication [M48.061]. Patient reports LBP began over 2 years with insidious onset while vacuuming. Pain is located on TAMY sides of the lower back and described as an intermittent pressure. MRI showed arthritis and DDD of the lumbar spine. Pt denies numbness and tingling radiating down the TAMY LE. Pain level is rated at 3/10 at the best, 3/10 currently, and 10/10 at the worst. LBP increases with forward bending, sweeping, mopping, vacuuming, and squatting, decreases with lying in sidelying.  Upon objective evaluation, patient demonstrates CPA hypermobility and TTP of L4/5, R SI hypomobility, impaired and painful trunk AROM in extension, TAMY SB, and R rotation, decreased glute med (~3/5) and glute max (~3+/5) strength, decreased core and multifidus strength, and impaired flexibility of TAMY piriformis and hamstring musculature. L/S AROM is as follows: Flexion = 100%, Extension = 50% p!, R/L Sidebending = 75%/75%, R/L Rot = 75%/100. ALEXANDER, Stork, Forward flexion special tests are positive indicating SIJ dysfunction. Patient scored 54 on FOTO indicating decreased functional status and quality of life. Patient can benefit from skilled PT interventions to improve L/S ROM, flexibility, core strength, decrease pain and TTP and for education on posture, body mechanics and lifting mechanics/transfers to facilitate ADL's & overall functional status/quality of life.    ==================================================================================  Problem List: pain affecting function, decrease ROM, decrease strength, edema affecting function, impaired gait/ balance, decrease ADL/ functional abilities, decrease activity tolerance, decrease flexibility/ joint mobility and decrease transfer abilities   Treatment Plan may include any combination of the following: Therapeutic exercise, Therapeutic activities, Neuromuscular re-education, Physical agent/modality, dry needling, Gait/balance training, Manual therapy and Patient education  Patient / Family readiness to learn indicated by: asking questions, trying to perform skills and interest  Persons(s) to be included in education: patient (P)  Barriers to Learning/Limitations: Yes  Measures taken: Larger Font   Patient Goal (s): \"strengthen back to relieve and reduce pain\"   Patient self reported health status: good  Rehabilitation Potential: good   Short Term Goals: To be accomplished in 2  weeks:  1) Establish HEP. 2) Patient will report decreased c/o pain to < or = 8/10 at the worst to facilitate performance of household chores with manageable sx in L/S.  3) Patient will report 25% improvement in ability to vacuum and mop floors.   4) Patient be independent with SI self correct in order to improve pain and increase functional mobility.  Long Term Goals: To be accomplished in 6 weeks:  1) Patient independent with HEP and able to demo proper body mechanics for floor to chest lifting. 2) Patient will increase L/S ROM in flexion, TAMY SB, and TAMY Rot to >/= to 85% and pain free to increase ability to perform household chores. 3) Increase FOTO to 64 indicating improved function and quality of life. 4) Patient to perform 75% bridge indicating improved core strength to improve ambulation around the grocery store. 5) Patient to increase walking tolerance to 1 hour in order to improve ease with prolonged grocery shopping. 6) Patient able to maintain SI mobility or self correct to increase ability to perform extended sitting. Frequency / Duration:   Patient to be seen  1-2  times per week for 4-6  weeks:  Patient / Caregiver education and instruction: self care, activity modification and exercises  G-Codes (GP): Mobility [id number] Current  CK= 40-59%   Goal  CJ= 20-39%. The severity rating is based on the FOTO Score    Eval Complexity: History: MEDIUM  Complexity : 1-2 comorbidities / personal factors will impact the outcome/ POC Exam:HIGH Complexity : 4+ Standardized tests and measures addressing body structure, function, activity limitation and / or participation in recreation  Presentation: MEDIUM Complexity : Evolving with changing characteristics  Clinical Decision Making:MEDIUM Complexity : FOTO score of 26-74Overall Complexity:MEDIUM    Therapist Signature: Fan Stock Date: 8/46/3332   Certification Period: 1/16/2018 to 4/15/2018 Time: 8:58 AM   ==================================================================================  I certify that the above Physical Therapy Services are being furnished while the patient is under my care. I agree with the treatment plan and certify that this therapy is necessary.   Physician Signature: Date:       Time:     Please sign and return to [facility] or you may fax the signed copy to [phone]. Thank you.

## 2018-01-18 ENCOUNTER — HOSPITAL ENCOUNTER (OUTPATIENT)
Dept: PHYSICAL THERAPY | Age: 78
Discharge: HOME | End: 2018-01-18
Payer: MEDICARE

## 2018-01-18 PROCEDURE — 97140 MANUAL THERAPY 1/> REGIONS: CPT

## 2018-01-18 PROCEDURE — 97110 THERAPEUTIC EXERCISES: CPT

## 2018-01-18 NOTE — PROGRESS NOTES
PHYSICAL THERAPY - DAILY TREATMENT NOTE    Patient Name: Rolf Carey        Date: 2018  : 1940   YES Patient  Verified  Visit #:   2     Insurance: Payor: Lizet Raul / Plan: [address] [place] [address] HMO / Product Type: Managed Care Medicare /      In time: 2:46 pm Out time: 3:19 pm   Total Treatment Time: 33     Medicare Time Tracking (below)   Total Timed Codes (min): 33 1:1 Treatment Time: 33     TREATMENT AREA =  Lumbar spondylosis [M47.816]  Spinal stenosis, lumbar region without neurogenic claudication [M48.061]    SUBJECTIVE  Pain Level (on 0 to 10 scale):   10-R hip   Medication Changes/New allergies or changes in medical history, any new surgeries or procedures? NO    If yes, update Summary List   Subjective Functional Status/Changes:  []  No changes reported     Pt reports \"this morning I was a 10/10 after sweeping the steps and cleaning off the whole car. \"         OBJECTIVE  Modalities Rationale:  Na      23 min Therapeutic Exercise:  [x]  See flow sheet   Rationale:      increase ROM and increase strength to improve the patients ability to forward bend, household chores, squatting     10 min Manual Therapy: Corrected R ant innominate with MET; prone TrPt release to R piriformis   Rationale:      decrease pain, increase ROM and increase tissue extensibility to improve patient's ability to improve tissue mobility for forward bending      throughout treatment min Patient Education:  YES  Reviewed HEP   []  Progressed/Changed HEP based on:  Good tolerance to exercise     Other Objective/Functional Measures: Add PPT, sit trunk FF, hip ABD/ADD, bridge, LTR, SKTC, BKFO  Pt instructed in written HEP. Post Treatment Pain Level (on 0 to 10) scale:   0  / 10     ASSESSMENT  Assessment/Changes in Function:     Increased mm and decreased tissue mobility R post hip complex.  Pt reported no increase in sx with exercise progression and verbalized good understanding of written HEP.      []  See Progress Note/Recertification   Patient will continue to benefit from skilled PT services to modify and progress therapeutic interventions, address functional mobility deficits, address ROM deficits, address strength deficits, analyze and address soft tissue restrictions and instruct in home and community integration to attain remaining goals. Progress toward goals / Updated goals:    First visit from initial evaluation . Established written HEP.       PLAN  []  Upgrade activities as tolerated YES Continue plan of care   []  Discharge due to :    []  Other:      Therapist: Sivan Bills PTA    Date: 1/18/2018 Time: 3:19  PM     Future Appointments  Date Time Provider Orlin Ferrara   1/23/2018 11:00 AM [address]   1/24/2018 2:30 PM [address]   1/31/2018 10:45 AM Barb Ramirez  [address]

## 2018-01-23 ENCOUNTER — HOSPITAL ENCOUNTER (OUTPATIENT)
Dept: PHYSICAL THERAPY | Age: 78
Discharge: HOME | End: 2018-01-23
Payer: MEDICARE

## 2018-01-23 PROCEDURE — 97110 THERAPEUTIC EXERCISES: CPT

## 2018-01-23 PROCEDURE — 97140 MANUAL THERAPY 1/> REGIONS: CPT

## 2018-01-23 NOTE — PROGRESS NOTES
PHYSICAL THERAPY - DAILY TREATMENT NOTE    Patient Name: Nathalie Sanders        Date: 2018  : 1940   YES Patient  Verified  Visit #:   3     Insurance: Payor: Sera Vazquez / Plan: [address] [place] [address] HMO / Product Type: Managed Care Medicare /      In time: 11:47 am Out time: 12:48 pm   Total Treatment Time: 61     Medicare Time Tracking (below)   Total Timed Codes (min):61 1:1 Treatment Time:  40     TREATMENT AREA =  Lumbar spondylosis [M47.816]  Spinal stenosis, lumbar region without neurogenic claudication [M48.061]    SUBJECTIVE  Pain Level (on 0 to 10 scale): 7  / 10   Medication Changes/New allergies or changes in medical history, any new surgeries or procedures? NO    If yes, update Summary List   Subjective Functional Status/Changes:  []  No changes reported     Pt reports \" just a 5 and 7 but yesterday. The pain was in the middle of my back like something pressing there. \" pt reports pain relief with use of heating. Pt reports she felt good last visit until later in the night. \"I had one day without any pain at all. \"         OBJECTIVE  Modalities Rationale:  Na      45 min Therapeutic Exercise:  [x]  See flow sheet   Rationale:      increase ROM and increase strength to improve the patients ability to perform forward bending, sweeping, mopping, vacuuming, and squatting     12 min Manual Therapy: Corrected R ant innominate with MET; prone TrPt release to R piriformis; pt instructed in self SI correction with MET   Rationale:      decrease pain, increase ROM, increase tissue extensibility and decrease trigger points to improve patient's ability to improve tissue mobility for forward bending    4 min Therapeutic Activity: Pt education in log roll supine to sit body mechanics   Rationale:    increase ROM, improve coordination and increase proprioception to improve the patients ability to Perform functional ADL's with good body mechanics.          min Patient Education:  Chong Adams Reviewed HEP   []  Progressed/Changed HEP based on: Other Objective/Functional Measures:    Review HEP, add post hip stretch and sit hamstring stretch, pt instruction in self MET. Pt education in     Post Treatment Pain Level (on 0 to 10) scale:  0  / 10     ASSESSMENT  Assessment/Changes in Function:   Pt demonstrating good tolerance to all exercise and verbalized good understanding of self SI correction for R ant innominate with MET. []  See Progress Note/Recertification   Patient will continue to benefit from skilled PT services to modify and progress therapeutic interventions, address functional mobility deficits, address ROM deficits, address strength deficits, analyze and address soft tissue restrictions, analyze and cue movement patterns and instruct in home and community integration to attain remaining goals. Progress toward goals / Updated goals:  1) Establish HEP. -currently in progress  2) Patient will report decreased c/o pain to < or = 8/10 at the worst to facilitate performance of household chores with manageable sx in L/S.  3) Patient will report 25% improvement in ability to vacuum and mop floors. 4) Patient be independent with SI self correct in order to improve pain and increase functional mobility.  -currently in progress      PLAN  []  Upgrade activities as tolerated YES Continue plan of care   []  Discharge due to :    []  Other:      Therapist: Fernie Tavera PTA    Date: 1/23/2018 Time: 12:48 PM     Future Appointments  Date Time Provider Orlin Ferrara   1/24/2018 2:30 PM Radha [address] [place] [address]   1/31/2018 10:45 AM Barb Ramirez  [address]

## 2018-01-24 ENCOUNTER — HOSPITAL ENCOUNTER (OUTPATIENT)
Dept: PHYSICAL THERAPY | Age: 78
Discharge: HOME | End: 2018-01-24
Payer: MEDICARE

## 2018-01-24 PROCEDURE — G8980 MOBILITY D/C STATUS: HCPCS

## 2018-01-24 PROCEDURE — 97110 THERAPEUTIC EXERCISES: CPT

## 2018-01-24 PROCEDURE — G8979 MOBILITY GOAL STATUS: HCPCS

## 2018-01-24 NOTE — PROGRESS NOTES
[address] PHYSICAL THERAPY  [address] [facility] [place] [address] - Phone: [phone]  Fax: [phone]  DISCHARGE SUMMARY FOR PHYSICAL THERAPY          Patient Name: Nika Farr : 1940   Treatment/Medical Diagnosis: Lumbar spondylosis [H59.083]  Spinal stenosis, lumbar region without neurogenic claudication [M48.061]   Onset Date:     Referral Source: Carmel Britt MD [facility]): 2018   Prior Hospitalization: See Medical History Provider #: [id number]   Prior Level of Function: Independent and painfree/painfull ADLs, IADLs, and recreational activity   Comorbidities: HTN, Hypercholesterolemia, Acid Reflux, and Cataracts   Medications: Verified on Patient Summary List   Visits from [facility]: 4 Missed Visits: 0     Goal/Measure of Progress Goal Met? 1. Patient independent with HEP and able to demo proper body mechanics for floor to chest lifting. Status at last Eval: Goal est  Current Status: I with HEP yes   2. Patient will increase L/S ROM in flexion, TAMY SB, and TAMY Rot to >/= to 85% and pain free to increase ability to perform household chores. Status at last Eval: Flexion = 100  R/L Sidebending = 75%/75%  R/L Rot = 75%/100% Current Status: Flexion = 100%  R/L Sidebending = 100%  R/L Rot = 85%/100% yes   3. Increase FOTO to 64 indicating improved function and quality of life. Status at last Eval: FOTO = 55 Current Status: FOTO = 65 yes   4. Patient to increase walking tolerance to 1 hour in order to improve ease with prolonged grocery shopping. Status at last Eval: Goal established Current Status: 45 minutes Progressing   5. Patient to perform 75% bridge indicating improved core strength to improve ambulation around the grocery store. Bridge = 50% Current Status: Bridge = 75% yes     Key Functional Changes/Progress:  The pt has progressed well with therapy, consistently reporting decreased pain and increased functional ability in sweeping and performance of household chores. Pt reports 100% improvement in L/S sx since initiating PT services. Based on progress from PT services and pt reported improvement, patient is appropriate for DC to home mgt of sx at this time    G-Codes (GP): Mobility   Goal  CJ= 20-39%  D/C  CJ= 20-39%. The severity rating is based on the FOTO Score    Assessments/Recommendations: Discontinue therapy. Progressing towards or have reached established goals. If you have any questions/comments please contact us directly at  [phone]. Thank you for allowing us to assist in the care of your patient.     Therapist Signature: Pearl Nelson Date: 1/24/2018   Reporting Period: 1/16/2018 to 1/24/2018 Time: 2:42 PM

## 2018-01-24 NOTE — PROGRESS NOTES
PHYSICAL THERAPY - DAILY TREATMENT NOTE    Patient Name: Myrtice Apgar        Date: 2018  : 1940   YES Patient  Verified  Visit #:   4     Insurance: Payor: Hector Ramy / Plan: [address] [place] [address] HMO / Product Type: Managed Care Medicare /      In time: 2;24pm Out time: 3:12pm   Total Treatment Time: 50     Medicare Time Tracking (below)   Total Timed Codes (min):  48 1:1 Treatment Time:  29     TREATMENT AREA =  Lumbar spondylosis [M47.816]  Spinal stenosis, lumbar region without neurogenic claudication [M48.061]  SUBJECTIVE  Pain Level (on 0 to 10 scale):  0  / 10   Medication Changes/New allergies or changes in medical history, any new surgeries or procedures? NO    If yes, update Summary List   Subjective Functional Status/Changes:  []  No changes reported     Pt states \"I feel 100% better\"         OBJECTIVE  Modalities Rationale:   PD    48 (bill 29)  min Therapeutic Exercise:  [x]  See flow sheet   Rationale:      increase ROM and increase strength to improve the patients ability to perform ADLs. min Patient Education:  YES  Reviewed HEP   []  Progressed/Changed HEP based on: Other Objective/Functional Measures:    See DC     Post Treatment Pain Level (on 0 to 10) scale:   0  / 10     ASSESSMENT  Assessment/Changes in Function:     See DC     []  See Progress Note/Recertification   Patient will continue to benefit from skilled PT services to modify and progress therapeutic interventions, address functional mobility deficits, address ROM deficits, address strength deficits, analyze and address soft tissue restrictions, analyze and cue movement patterns, analyze and modify body mechanics/ergonomics and assess and modify postural abnormalities to attain remaining goals.    Progress toward goals / Updated goals:    See DC     PLAN  [x]  Upgrade activities as tolerated YES Continue plan of care   []  Discharge due to :    []  Other:      Therapist: Sloan Fothergill, DPT     Date: 1/24/2018 Time: 8:34 AM     Future Appointments  Date Time Provider Orlin Ferrara   1/24/2018 2:30 PM [address]   1/31/2018 10:45 AM Magui Tracy  [address]

## 2018-01-31 ENCOUNTER — OFFICE VISIT (OUTPATIENT)
Dept: ORTHOPEDIC SURGERY | Age: 78
End: 2018-01-31

## 2018-01-31 VITALS
WEIGHT: 167.4 LBS | OXYGEN SATURATION: 98 % | SYSTOLIC BLOOD PRESSURE: 107 MMHG | BODY MASS INDEX: 28.58 KG/M2 | HEIGHT: 64 IN | DIASTOLIC BLOOD PRESSURE: 51 MMHG | HEART RATE: 85 BPM | RESPIRATION RATE: 18 BRPM | TEMPERATURE: 97.9 F

## 2018-01-31 DIAGNOSIS — M48.061 LUMBAR STENOSIS WITHOUT NEUROGENIC CLAUDICATION: Primary | ICD-10-CM

## 2018-01-31 DIAGNOSIS — M46.1 SACROILIITIS (HCC): ICD-10-CM

## 2018-01-31 NOTE — PATIENT INSTRUCTIONS
Lumbar Spinal Stenosis: Care Instructions  Your Care Instructions    Stenosis in the spine is a narrowing of the canal that is around the spinal cord and nerve roots in your back. It can happen as part of aging. Sometimes bone and other tissue grow into this canal and press on the nerves that branch out from the spinal cord. This can cause pain, numbness, and weakness. When it happens in the lower part of your back, it is called lumbar spinal stenosis. It can cause problems in the legs, feet, and rear end (buttocks). You may be able to get relief from the symptoms of spinal stenosis by taking pain medicine. Your doctor may suggest physical therapy and exercises to keep your spine strong and flexible. Some people try steroid shots to reduce swelling. If pain and numbness in your legs are still so bad that you cannot do your normal activities, you may need surgery. Follow-up care is a key part of your treatment and safety. Be sure to make and go to all appointments, and call your doctor if you are having problems. It's also a good idea to know your test results and keep a list of the medicines you take. How can you care for yourself at home? · Take an over-the-counter pain medicine, such as acetaminophen (Tylenol), ibuprofen (Advil, Motrin), or naproxen (Aleve). Be safe with medicines. Read and follow all instructions on the label. · Do not take two or more pain medicines at the same time unless the doctor told you to. Many pain medicines have acetaminophen, which is Tylenol. Too much acetaminophen (Tylenol) can be harmful. · Stay at a healthy weight. Being overweight puts extra strain on your spine. · Change positions often when you sit or stand. This can ease pain. It may also reduce pressure on the spinal cord and its nerves. · Avoid doing things that make your symptoms worse. Walking downhill and standing for a long time may cause pain.   · Stretch and strengthen your back muscles as your doctor or physical therapist recommends. If your doctor says it is okay to do them, these exercises may help. ¨ Lie on your back with your knees bent. Gently pull one bent knee to your chest. Put that foot back on the floor, and then pull the other knee to your chest.  ¨ Do pelvic tilts. Lie on your back with your knees bent. Tighten your stomach muscles. Pull your belly button (navel) in and up toward your ribs. You should feel like your back is pressing to the floor and your hips and pelvis are slightly lifting off the floor. Hold for 6 seconds while breathing smoothly. ¨ Stand with your back flat against a wall. Slowly slide down until your knees are slightly bent. Hold for 10 seconds, then slide back up the wall. · Remove or change anything in your house that may cause you to fall. Keep walkways clear of clutter, electrical cords, and throw rugs. When should you call for help? Call 911 anytime you think you may need emergency care. For example, call if:  ? · You are unable to move a leg at all. ?Call your doctor now or seek immediate medical care if:  ? · You have new or worse symptoms in your legs, belly, or buttocks. Symptoms may include:  ¨ Numbness or tingling. ¨ Weakness. ¨ Pain. ? · You lose bladder or bowel control. ? Watch closely for changes in your health, and be sure to contact your doctor if you are not getting better as expected. Where can you learn more? Go to http://aruna-angelo.info/. Orly Oneill in the search box to learn more about \"Lumbar Spinal Stenosis: Care Instructions. \"  Current as of: March 21, 2017  Content Version: 11.4  © 5002-9314 [employer]. Care instructions adapted under license by [employer] (which disclaims liability or warranty for this information).  If you have questions about a medical condition or this instruction, always ask your healthcare professional. [address] any warranty or liability for your use of this information.

## 2018-01-31 NOTE — PROGRESS NOTES
[address]  [address], [address]  [place] [address]  Phone: [phone]  Fax: [phone]        Gordy Garcia  : 1940  PCP: Kavin Steele MD    PROGRESS NOTE      ASSESSMENT AND PLAN    Diagnoses and all orders for this visit:    1. Lumbar stenosis without neurogenic claudication    2. Sacroiliitis ([address]    1. Advised to continue HEP. 2. Continue PRN Aleve. 3. Urgent symptoms discussed with pt.   4. Given information on stenosis. Follow-up Disposition:  Return if symptoms worsen or fail to improve. HISTORY OF PRESENT ILLNESS  Bonita Branch is a 65 y.o. female. Pt presents to the office for a f/u visit for back pain and PT. Pt has been to physical therapy with great improvement. She denies any pain in the office today. Her pain has not bothered her since starting PT. She reports normal standing and walking tolerance. Pt reports pain does not radiate into BLE. Pt denies weakness in BLE. Pt denies saddle paresthesias. Pt states she has been using PRN Aleve with  relief. Has not needed to take Aleve lately. Denies persistent fevers, chills, weight changes, neurogenic bowel or bladder symptoms. Pt denies recent ED visits or hospitalizations.  reviewed. Pain Assessment  2018   Location of Pain Back   Severity of Pain 0   Quality of Pain -   Frequency of Pain -   Aggravating Factors -   Aggravating Factors Comment -   Relieving Factors -       PAST MEDICAL HISTORY   Past Medical History:   Diagnosis Date    Arthritis     Chronic pain     LOWE BACK    GERD (gastroesophageal reflux disease)     Hypercholesterolemia     Hypertension        Past Surgical History:   Procedure Laterality Date    COLONOSCOPY N/A 2017    COLONOSCOPY performed by Adelina Hudson MD at [facility]COPY    HX BREAST BIOPSY      right     HX HYSTERECTOMY     .       MEDICATIONS    Current Outpatient Prescriptions   Medication Sig Dispense Refill    raNITIdine (ZANTAC) 300 mg tablet TAKE 1 TABLET BY MOUTH AT BEDTIME  0    omeprazole (PRILOSEC) 20 mg capsule Take 20 mg by mouth daily.  BENICAR HCT 40-12.5 mg per tablet       simvastatin (ZOCOR) 40 mg tablet       multivitamin (ONE A DAY) tablet Take 1 tablet by mouth daily.  aspirin 81 mg chewable tablet Take 81 mg by mouth daily.  cod liver oil oil Take  by mouth. ALLERGIES  Allergies   Allergen Reactions    Neuromuscular Blockers, Steroidal Itching          SOCIAL HISTORY    Social History     Social History    Marital status: SINGLE     Spouse name: N/A    Number of children: N/A    Years of education: N/A     Occupational History    Not on file. Social History Main Topics    Smoking status: Never Smoker    Smokeless tobacco: Never Used    Alcohol use No    Drug use: No    Sexual activity: Not on file     Other Topics Concern    Not on file     Social History Narrative       FAMILY HISTORY  Family History   Problem Relation Age of Onset    Heart Attack Mother     Cancer Father        REVIEW OF SYSTEMS  Review of Systems   Constitutional: Negative for chills, fever and weight loss. Respiratory: Negative for shortness of breath. Cardiovascular: Negative for chest pain. Gastrointestinal: Negative for constipation. Negative for fecal incontinence   Genitourinary: Negative for dysuria. Negative for urinary incontinence   Musculoskeletal:        Per HPI   Skin: Negative for rash. Neurological: Negative for dizziness, tingling, tremors, focal weakness and headaches. Endo/Heme/Allergies: Does not bruise/bleed easily. Psychiatric/Behavioral: The patient does not have insomnia. PHYSICAL EXAMINATION  Visit Vitals    /51    Pulse 85    Temp 97.9 °F (36.6 °C) (Oral)    Resp 18    Ht 5' 4\" (1.626 m)    Wt 167 lb 6.4 oz (75.9 kg)    SpO2 98%    BMI 28.73 kg/m2         Accompanied by self.       Constitutional: Well developed, well nourished, in no acute distress. Psychiatric: Affect and mood are appropriate. Integumentary: No rashes or abrasions noted on exposed areas. Cardiovascular/Peripheral Vascular: Intact l pulses. No peripheral edema is noted. Lymphatic:  No evidence of lymphedema. No cervical lymphadenopathy. SPINE/MUSCULOSKELETAL EXAM    Lumbar spine:  No rash, ecchymosis, or gross obliquity. No fasciculations. No focal atrophy is noted. No tenderness to palpation at the sciatic notch. SI joints non-tender. Trochanters non tender. Sensation grossly intact to light touch. MOTOR:       Hip Flex  Quads Hamstrings Ankle DF EHL Ankle PF   Right +4/5 +4/5 +4/5 +4/5 +4/5 +4/5   Left +4/5 +4/5 +4/5 +4/5 +4/5 +4/5       Straight Leg raise negative. Ambulation without assistive device. FWB. Written by Mahsa Granados, as dictated by Kartik Holliday MD.    I, Dr. Karitk Holliday MD, confirm that all documentation is accurate. Ms. Kerri Macias may have a reminder for a \"due or due soon\" health maintenance. I have asked that she contact her primary care provider for follow-up on this health maintenance.

## 2018-01-31 NOTE — MR AVS SNAPSHOT
Agustin Chris 
 
 
 [facility] [address] [place] [address] 
[phone] Patient: Delta Sebastian MRN: [id number] AGQ:8/88/0752 Visit Information Date & Time Provider Department Dept. Phone Encounter #  
 1/31/2018 10:45 AM Erickson Gonzales MD [facility] [phone] [id number] Follow-up Instructions Return if symptoms worsen or fail to improve. Upcoming Health Maintenance Date Due DTaP/Tdap/Td series (1 - Tdap) 4/11/1960 ZOSTER VACCINE AGE 60> 2/11/1999 GLAUCOMA SCREENING Q2Y 4/11/2004 Pneumococcal 65+ Low/Medium Risk (1 of 2 - PCV13) 4/11/2004 MEDICARE YEARLY EXAM 4/11/2004 Influenza Age 5 to Adult 8/1/2017 Allergies as of 1/31/2018  Review Complete On: 1/31/2018 By: Parth Varma LPN Severity Noted Reaction Type Reactions Neuromuscular Blockers, Steroidal  05/28/2014    Itching Current Immunizations  Never Reviewed No immunizations on file. Not reviewed this visit You Were Diagnosed With   
  
 Codes Comments Lumbar stenosis without neurogenic claudication    -  Primary ICD-10-CM: M48.061 
ICD-9-CM: 724.02 Sacroiliitis ([facility]     ICD-10-CM: M46.1 ICD-9-CM: 720.2 Vitals BP Pulse Temp Resp Height(growth percentile) Weight(growth percentile) 107/51 85 97.9 °F (36.6 °C) (Oral) 18 5' 4\" (1.626 m) 167 lb 6.4 oz (75.9 kg) SpO2 BMI OB Status Smoking Status 98% 28.73 kg/m2 Hysterectomy Never Smoker BMI and BSA Data Body Mass Index Body Surface Area 28.73 kg/m 2 1.85 m 2 Your Updated Medication List  
  
   
This list is accurate as of: 1/31/18 11:59 AM.  Always use your most recent med list.  
  
  
  
  
 aspirin 81 mg chewable tablet Take 81 mg by mouth daily. BENICAR HCT 40-12.5 mg per tablet Generic drug:  olmesartan-hydroCHLOROthiazide  
  
 cod liver oil Oil Take  by mouth. multivitamin tablet Commonly known as:  ONE A DAY Take 1 tablet by mouth daily. omeprazole 20 mg capsule Commonly known as:  PRILOSEC Take 20 mg by mouth daily. raNITIdine 300 mg tablet Commonly known as:  ZANTAC TAKE 1 TABLET BY MOUTH AT BEDTIME  
  
 simvastatin 40 mg tablet Commonly known as:  ZOCOR Follow-up Instructions Return if symptoms worsen or fail to improve. Patient Instructions Lumbar Spinal Stenosis: Care Instructions Your Care Instructions Stenosis in the spine is a narrowing of the canal that is around the spinal cord and nerve roots in your back. It can happen as part of aging. Sometimes bone and other tissue grow into this canal and press on the nerves that branch out from the spinal cord. This can cause pain, numbness, and weakness. When it happens in the lower part of your back, it is called lumbar spinal stenosis. It can cause problems in the legs, feet, and rear end (buttocks). You may be able to get relief from the symptoms of spinal stenosis by taking pain medicine. Your doctor may suggest physical therapy and exercises to keep your spine strong and flexible. Some people try steroid shots to reduce swelling. If pain and numbness in your legs are still so bad that you cannot do your normal activities, you may need surgery. Follow-up care is a key part of your treatment and safety. Be sure to make and go to all appointments, and call your doctor if you are having problems. It's also a good idea to know your test results and keep a list of the medicines you take. How can you care for yourself at home? · Take an over-the-counter pain medicine, such as acetaminophen (Tylenol), ibuprofen (Advil, Motrin), or naproxen (Aleve). Be safe with medicines. Read and follow all instructions on the label. · Do not take two or more pain medicines at the same time unless the doctor told you to.  Many pain medicines have acetaminophen, which is Tylenol. Too much acetaminophen (Tylenol) can be harmful. · Stay at a healthy weight. Being overweight puts extra strain on your spine. · Change positions often when you sit or stand. This can ease pain. It may also reduce pressure on the spinal cord and its nerves. · Avoid doing things that make your symptoms worse. Walking downhill and standing for a long time may cause pain. · Stretch and strengthen your back muscles as your doctor or physical therapist recommends. If your doctor says it is okay to do them, these exercises may help. ¨ Lie on your back with your knees bent. Gently pull one bent knee to your chest. Put that foot back on the floor, and then pull the other knee to your chest. 
¨ Do pelvic tilts. Lie on your back with your knees bent. Tighten your stomach muscles. Pull your belly button (navel) in and up toward your ribs. You should feel like your back is pressing to the floor and your hips and pelvis are slightly lifting off the floor. Hold for 6 seconds while breathing smoothly. ¨ Stand with your back flat against a wall. Slowly slide down until your knees are slightly bent. Hold for 10 seconds, then slide back up the wall. · Remove or change anything in your house that may cause you to fall. Keep walkways clear of clutter, electrical cords, and throw rugs. When should you call for help? Call 911 anytime you think you may need emergency care. For example, call if: 
? · You are unable to move a leg at all. ?Call your doctor now or seek immediate medical care if: 
? · You have new or worse symptoms in your legs, belly, or buttocks. Symptoms may include: ¨ Numbness or tingling. ¨ Weakness. ¨ Pain. ? · You lose bladder or bowel control. ? Watch closely for changes in your health, and be sure to contact your doctor if you are not getting better as expected. Where can you learn more? Go to http://aruna-angeol.info/. Violeta Farmer in the search box to learn more about \"Lumbar Spinal Stenosis: Care Instructions. \" Current as of: March 21, 2017 Content Version: 11.4 © 7192-3131 [employer]. Care instructions adapted under license by [employer] (which disclaims liability or warranty for this information). If you have questions about a medical condition or this instruction, always ask your healthcare professional. [address] any warranty or liability for your use of this information. Introducing [facility]! Gisella Manjarrez introduces PoachIt patient portal. Now you can access parts of your medical record, email your doctor's office, and request medication refills online. 1. In your internet browser, go to [email or web address] 2. Click on the First Time User? Click Here link in the Sign In box. You will see the New Member Sign Up page. 3. Enter your PoachIt Access Code exactly as it appears below. You will not need to use this code after youve completed the sign-up process. If you do not sign up before the expiration date, you must request a new code. · PoachIt Access Code: [email or web address] Expires: 3/20/2018  2:05 PM 
 
4. Enter the last four digits of your Social Security Number (xxxx) and Date of Birth (mm/dd/yyyy) as indicated and click Submit. You will be taken to the next sign-up page. 5. Create a PoachIt ID. This will be your PoachIt login ID and cannot be changed, so think of one that is secure and easy to remember. 6. Create a PoachIt password. You can change your password at any time. 7. Enter your Password Reset Question and Answer. This can be used at a later time if you forget your password. 8. Enter your e-mail address. You will receive e-mail notification when new information is available in [address] 9. Click Sign Up. You can now view and download portions of your medical record. 10. Click the Download Summary menu link to download a portable copy of your medical information. If you have questions, please visit the Frequently Asked Questions section of the [employer] website. Remember, [employer]ic is NOT to be used for urgent needs. For medical emergencies, dial 911. Now available from your iPhone and Android! Please provide this summary of care documentation to your next provider. Your primary care clinician is listed as Sherrieple Namrata. If you have any questions after today's visit, please call [phone].

## 2022-08-25 ENCOUNTER — HOSPITAL ENCOUNTER (OUTPATIENT)
Dept: PHYSICAL THERAPY | Age: 82
Discharge: HOME | End: 2022-08-25
Payer: MEDICARE

## 2022-08-25 PROCEDURE — 97162 PT EVAL MOD COMPLEX 30 MIN: CPT

## 2022-08-25 NOTE — PROGRESS NOTES
PHYSICAL THERAPY - DAILY TREATMENT NOTE    Patient Name: Jorge Luis Manjarrez        Date: 2022  : 1940   YES Patient  Verified  Visit #:      of   8  Insurance: Payor: Fernando Hernandez / Plan: [address] [place] [address] HMO / Product Type: Managed Care Medicare /      In time: 8:45 Out time: 9:29   Total Treatment Time: 44     Medicare/BCBS [place] Time Tracking (below)   Total Timed Codes (min):  0 1:1 Treatment Time:  0     TREATMENT AREA =  Apraxia, gait instability    SUBJECTIVE    Pain Level (on 0 to 10 scale):  0  / 10   Medication Changes/New allergies or changes in medical history, any new surgeries or procedures? NO    If yes, update Summary List   Subjective Functional Status/Changes:  []  No changes reported     Pt reports that she has noticed that when she is walking, she drifts to the right. Pt reports that she first noticed it last year. Pt denies dizziness but reports that she occasionally has had vertigo in the morning but not recently, denies weakness, denies numbness/tingling. Pt reports that she has back pain due to DDD and left knee pain due to arthritis. Pt reports that her PCP told her that she had fluid in ear and sent her to ENT. Pt reports that ENT told her that she needed to see a neurologist.  Pt reports that she went back to PCP, who said that she had vertigo and sent her to neurologist.  Pt reports that neurologist ordered MRI and told her that she is losing some brain cells, not much, and that as you age things happen.        OBJECTIVE       Physical Therapy Evaluation - Vestibular    Posture:  [] WNL      [x] Forward head    [x] Protracted shoulders    [] Retracted shoulders  [] Kyphosis:  [x] increased   [] decreased   [] Lordosis:   [] increased   [x] decreased  Other:    C/S ROM: [x] WFL    [] Limited    Describe:    Strength: [] WFL    [x] Limited    Describe: B UE strength grossly WFL; B hip flex = 4/5, left knee ext = 3/5, left knee flex = 4/5, right knee ext = 5/5, right knee flex = 5/5, B ankle DF = 5/5    Optional Tests:  Sensation:  [] Intact [] Diminished    Describe: NT    Proprioception: [] Intact [] Diminished    Describe: NT    Coordination Testing:       Disdiadochokinesia [x] WFL    [] Impaired    Describe:       Heel - Oliva   [] WFL    [] Impaired    Describe: NT       FNF   [] WFL    [] Impaired    Describe: NT       Toe Tap   [x] WFL    [] Impaired    Describe:    Oculomotor Tests: (Fixation Not Blocked)       Ocular ROM:   [x] WFL    [] Limited    Describe:       Spontaneous Nystag. [x] Neg     [] Pos    [] Left    [] Right       Gaze Holding Nystag. [x] Neg     [] Pos    [] Left    [] Right        Smooth Pursuit  [x] Neg     [] Pos    [] Left    [] Right        Saccades   [x] Neg     [] Pos    [] Left    [] Right        VOR - Slow Head Mvmt [x] Neg     [] Pos    [] Left    [] Right        VOR - Fast Head Mvmt [] Neg     [x] Pos    [] Left    [] Right        Head Thrust  [] Neg     [] Pos    [] Left    [] Right        Static Visual Acuity [] Neg     [] Pos    [] Left    [] Right        Dynamic Visual Acuity [] Neg     [] Pos    [] Left    [] Right     Other Special Tests:       Vertebral Artery Testing [] Neg     [] Pos    [] Left    [] Right       Hallpike-Sam Maneuver [] Neg     [] Pos    [] Left    [] Right       Roll Test   [] Neg     [] Pos    [] Left    [] Right       Her Balance Scale [] Neg     [] Pos    Score:       Dynamic Gait Index [] Neg     [] Pos    Score:       Functional Gait Assess.  [] Neg     [x] Pos    Score: 19/30    Balance Standard Testing (Eyes Open/Eyes Closed - EO/EC)       Romberg   [x] WFL    [] Pos    Describe: 30\"/30\"          Stand on Foam  [] WFL    [x] Pos    Describe: 30\"/2\"       Standing on Rail  [] WFL    [] Pos    Describe: NT       Sharpened Romberg [] WFL    [x] Pos    Describe: 6\" left/30\" right       Single Leg Stand  [x] WFL    [] Pos    Describe: 16\" left/12\" right    Motion Sensitivity:  [] Neg     [] Pos Describe:    Computerized Dynamic Posturography:        [] Not Tested    [] WFL    Score:     Other Tests:  FTSST = 19\" (wide YUMIKO)    During eval min Patient Education:  YES  Reviewed HEP   []  Progressed/Changed HEP based on:   Initiated VOR x 1 seated     Other Objective/Functional Measures:    See eval       Post Treatment Pain Level (on 0 to 10) scale:   0  / 10     ASSESSMENT    Assessment/Changes in Function:     See plan of care    Justification for Eval Code Complexity:  Patient History : HIGH - osteoporosis, arthritis, HTN, vertigo  Examination HIGH - see objective  Clinical Presentation: MEDIUM  Clinical Decision Making : MEDIUM - FOTO complexity level       []  See Progress Note/Recertification   Patient will continue to benefit from skilled PT services: see plan of care   Progress toward goals / Updated goals:    See plan of care     PLAN    [x]  Upgrade activities as tolerated YES Continue plan of care   []  Discharge due to :    []  Other:      Therapist: Erickson Nielsen, PT    Date: 8/25/2022 Time: 8:44 AM

## 2022-08-25 NOTE — THERAPY EVALUATION
[address] [place] [address] PHYSICAL THERAPY  [address] [place], [place] [address]devan Ham, [address] - Phone: [phone]  Fax: [phone] / [facility]  Patient Name: Brannon Rivera : 1940   Medical   Diagnosis: Gait instability [R26.81] Treatment Diagnosis: Gait instability [R26.81]   Onset Date:      Referral Source: Yolanda Hopper MD Start of Care [facility]): 2022   Prior Hospitalization: See medical history Provider #: [id number]   Prior Level of Function: Independent with ADLs, ambulation   Comorbidities: Osteoporosis, arthritis, HTN, vertigo   Medications: Verified on Patient Summary List   The Plan of Care and following information is based on the information from the initial evaluation.   ===========================================================================================  Assessment / key information:  Patient is a 79 y.o. female who presents with complaints of drifting to right with ambulation. Patient demonstrates mild postural impairments, decreased LE strength (B hip flex = 4/5, left knee ext = 3/5, left knee flex = 4/5, right knee ext = 5/5, right knee flex = 5/5, B ankle DF = 5/5), impaired balance with foam stance EO/EC = 30\"/2\" and sharpened Romberg EO = 30\" right/6\" left, impaired transfers with Five Time Sit to Stand Test = 19\" and impaired gait with Functional Gait Assessment = 19/30, indicating increased risk of fall. Patient would benefit from skilled PT services to address these issues and improve function.   Thank you for this referral.    FOTO: 52/100 (moderate severity)  ==========================================================================================  Eval Complexity: History: HIGH Complexity :3+ comorbidities / personal factors will impact the outcome/ POC Exam:HIGH Complexity : 4+ Standardized tests and measures addressing body structure, function, activity limitation and / or participation in recreation  Presentation: MEDIUM Complexity : Evolving with changing characteristics  Clinical Decision Making:Other outcome measures FOTO complexity level  MEDIUMOverall Complexity:MEDIUM    Problem List: pain affecting function, decrease ROM, decrease strength, edema affecting function, impaired gait/ balance, decrease ADL/ functional abilitiies, decrease activity tolerance, decrease flexibility/ joint mobility, and decrease transfer abilities   Treatment Plan may include any combination of the following: Therapeutic exercise, Therapeutic activities, Neuromuscular re-education, Physical agent/modality, Gait/balance training, Manual therapy, Patient education, Self Care training, Functional mobility training, Home safety training, and Stair training  Patient / Family readiness to learn indicated by: asking questions, trying to perform skills, and interest  Persons(s) to be included in education: patient (P)  Barriers to Learning/Limitations: yes;  financial ($40/visit copay)  Measures taken:    Patient Goal (s): \"Balance improvement. \"   Patient self reported health status: good  Rehabilitation Potential: good  Short Term Goals: To be accomplished in  4  weeks:  Patient will demonstrate compliance with HEP. Patient will score greater than or equal to 22/30 on FGA to indicate improved balance/decreased fall risk. Patient will maintain foam stance eyes closed 30\" to increase safety in challenging environments. Long Term Goals: To be accomplished in  8  weeks:  Patient will demonstrate independence with HEP. Patient will complete FTSST in less than or equal to 15\" to indicate improved transfers. Patient will score greater than or equal to 56/100 on FOTO to indicate improved function.   Frequency / Duration:   Patient to be seen  1  times per week for 8  weeks:  Patient / Caregiver education and instruction: exercises    Therapist Signature: Ibis Swan PT Date: 5/88/1252   Certification Period: 8/25/2022-11/24/2022 Time: 8:44 AM   ===========================================================================================  I certify that the above Physical Therapy Services are being furnished while the patient is under my care. I agree with the treatment plan and certify that this therapy is necessary. Physician Signature:        Date:       Time:     Please sign and return to [employer] or you may fax the signed copy to [phone]. Thank you.

## 2022-08-30 ENCOUNTER — HOSPITAL ENCOUNTER (OUTPATIENT)
Dept: PHYSICAL THERAPY | Age: 82
Discharge: HOME | End: 2022-08-30
Payer: MEDICARE

## 2022-08-30 PROCEDURE — 97112 NEUROMUSCULAR REEDUCATION: CPT

## 2022-08-30 PROCEDURE — 97110 THERAPEUTIC EXERCISES: CPT

## 2022-08-30 NOTE — PROGRESS NOTES
PHYSICAL THERAPY - DAILY TREATMENT NOTE    Patient Name: Armando Nolasco        Date: 2022  : 1940   YES Patient  Verified  Visit #:   2   of   4  Insurance: Payor: John Terry / Plan: [address] [place] [address] HMO / Product Type: Managed Care Medicare /      In time: 8:00 Out time: 8:42   Total Treatment Time: 42     Medicare/BCBS [place] Time Tracking (below)   Total Timed Codes (min):  42 1:1 Treatment Time:  42     TREATMENT AREA =  Gait instability [R26.81]  SUBJECTIVE    Pain Level (on 0 to 10 scale):  5  / 10   Medication Changes/New allergies or changes in medical history, any new surgeries or procedures? NO    If yes, update Summary List   Subjective Functional Status/Changes:  []  No changes reported     Pt reports left knee pain. OBJECTIVE  25 min Therapeutic Exercise:  [x]  See flow sheet   Rationale:      increase ROM, increase strength, improve coordination, improve balance, and increase proprioception to improve the patients ability to perform ADLs/IADLs, functional mobility and gait safely and independently without increased pain/symptoms     17 min Neuromuscular Re-ed: See flow sheet   Rationale: improve balance to improve the patient's ability to perform ADLs/IADLs, functional mobility and gait safely and independently without increased pain/symptoms      During TE min Patient Education:  YES  Reviewed HEP   [x]  Progressed/Changed HEP based on:   Initiated HEP - copy in chart     Other Objective/Functional Measures:    Initiated exercises per flow sheet    Access Code: [email or web address]  URL: [email or web address]  Date: 2022  Prepared by: Elsie Espino    Exercises  Supine Bridge - 1 x daily - 7 x weekly - 1 sets - 10 reps - 3 second hold  Supine Straight Leg Raises - 1 x daily - 7 x weekly - 1 sets - 10 reps - 3 second hold  Sidelying Hip Abduction - 1 x daily - 7 x weekly - 1 sets - 10 reps - 3 second hold  Prone Hip Extension - 1 x daily - 7 x weekly - 1 sets - 10 reps - 3 second hold     Post Treatment Pain Level (on 0 to 10) scale:   0  / 10     ASSESSMENT    Assessment/Changes in Function:     Tolerated exercises without complaints     []  See Progress Note/Recertification   Patient will continue to benefit from skilled PT services to modify and progress therapeutic interventions, address functional mobility deficits, address ROM deficits, address strength deficits, analyze and address soft tissue restrictions, analyze and cue movement patterns, analyze and modify body mechanics/ergonomics, assess and modify postural abnormalities, address imbalance/dizziness, and instruct in home and community integration to attain remaining goals. Progress toward goals / Updated goals:    Progressing toward goals:  Short Term Goals: To be accomplished in  4  weeks:  Patient will demonstrate compliance with HEP. Patient will score greater than or equal to 22/30 on FGA to indicate improved balance/decreased fall risk. Patient will maintain foam stance eyes closed 30\" to increase safety in challenging environments. Long Term Goals: To be accomplished in  8  weeks:  Patient will demonstrate independence with HEP. Patient will complete FTSST in less than or equal to 15\" to indicate improved transfers. Patient will score greater than or equal to 56/100 on FOTO to indicate improved function.        PLAN    [x]  Upgrade activities as tolerated YES Continue plan of care   []  Discharge due to :    []  Other:      Therapist: Nuno Rodríguez PT    Date: 8/30/2022 Time: 7:59 AM     Future Appointments   Date Time Provider Orlin Ferrara   8/30/2022  8:00 AM Navi Lee PT [facility]   9/1/2022  3:30 PM Bereket Peck PT [facility]   9/7/2022  8:45 AM Navi Lee PT [facility]   9/9/2022  1:15 PM Luanne Larry [facility]   9/12/2022  8:45 AM Luanne NICEPTNA [facility]   9/14/2022  8:00 AM Navi Lee PT [facility]   9/19/2022  9:30 AM Lashawn West [facility]   9/21/2022  8:00 AM Grazyna Coyle, PT [facility]   9/26/2022  9:30 AM Lashawn West [facility]   9/28/2022  8:45 AM Grazyna Coyle, PT [facility]

## 2022-09-01 ENCOUNTER — APPOINTMENT (OUTPATIENT)
Dept: PHYSICAL THERAPY | Age: 82
End: 2022-09-01
Payer: MEDICARE

## 2022-09-07 ENCOUNTER — APPOINTMENT (OUTPATIENT)
Dept: PHYSICAL THERAPY | Age: 82
End: 2022-09-07
Payer: MEDICARE

## 2022-09-09 ENCOUNTER — HOSPITAL ENCOUNTER (OUTPATIENT)
Dept: PHYSICAL THERAPY | Age: 82
Discharge: HOME | End: 2022-09-09
Payer: MEDICARE

## 2022-09-09 PROCEDURE — 97110 THERAPEUTIC EXERCISES: CPT

## 2022-09-09 PROCEDURE — 97112 NEUROMUSCULAR REEDUCATION: CPT

## 2022-09-09 NOTE — PROGRESS NOTES
PHYSICAL THERAPY - DAILY TREATMENT NOTE    Patient Name: Brannon Rivera        Date: 2022  : 1940   YES Patient  Verified  Visit #:   3   of   4  Insurance: Payor: Seth Velazquez / Plan: [address] [place] [address] HMO / Product Type: Managed Care Medicare /      In time: 1:15 Out time: 2:00   Total Treatment Time: 45     Medicare/BCBS [place] Time Tracking (below)   Total Timed Codes (min):  45 1:1 Treatment Time:  45     TREATMENT AREA =  Gait instability [R26.81]    SUBJECTIVE    Pain Level (on 0 to 10 scale):  5  / 10 left knee    Medication Changes/New allergies or changes in medical history, any new surgeries or procedures? NO    If yes, update Summary List   Subjective Functional Status/Changes:  []  No changes reported     Pt reports left knee hurts with walking stairs. Pt states she has been working on her exercises at home, pt reports the one on her stomach hurt a little, but everything else was okay. OBJECTIVE    17 min Therapeutic Exercise:  [x]  See flow sheet   Rationale:    increase ROM, increase strength, improve coordination, improve balance and increase proprioception to promote increased functional mobility and increased activity tolerance with ADL's.    28 min Neuromuscular Re-ed: Static standing balance exercises with EO/EC on compliant and non-compliant surfaces. Rationale:     improve coordination, improve balance and increase proprioception to improve the patients ability to perform ADLs, transfers and gait safely and independently. min Patient Education:  YES  Reviewed HEP   []  Progressed/Changed HEP based on:   Updated balance HEP-see chart. Other Objective/Functional Measures:    progressed static standing balance as able  EO MSR bun 30\" B   EO MSR GT 30\" B   EC MSR instep 30\" B   EC MSR bun left 11\", right 25\"   EO MSR bun on Airex 30\" B   EC ROM on Airex 30\"     Lenward Susanna for form with LE strengthening ex.  Cued pt to focus on pelvic stability with SLR 3 way. Pt unsteady, but without LOB during dynamic gait activity. VOR X 1 with background, pt reports no challenge, progressed pt to VOR X 1 on compliant surace. Post Treatment Pain Level (on 0 to 10) scale:   5  / 10     ASSESSMENT    Assessment/Changes in Function:     Pt able to progress to VOR X 1 on compliant surface. []  See Progress Note/Recertification   Patient will continue to benefit from skilled PT services to modify and progress therapeutic interventions, address functional mobility deficits, address strength deficits, analyze and cue movement patterns, assess and modify postural abnormalities, address imbalance/dizziness, and instruct in home and community integration to attain remaining goals. Progress toward goals / Updated goals:    Patient will demonstrate compliance with HEP. Patient will score greater than or equal to 22/30 on FGA to indicate improved balance/decreased fall risk. dynamic gait activity for balance   Patient will maintain foam stance eyes closed 30\" to increase safety in challenging environments.      PLAN    []  Upgrade activities as tolerated YES Continue plan of care   []  Discharge due to :    []  Other:      Therapist: Dasha Hernández PTA    Date: 9/9/2022 Time: 1:51 PM     Future Appointments   Date Time Provider Orlin Ferrara   9/14/2022  8:00 AM Khanh Holman, PT [facility]   9/21/2022  8:00 AM Khanh Holman PT [facility]   9/28/2022  8:45 AM Khanh Holman PT [facility]

## 2022-09-12 ENCOUNTER — APPOINTMENT (OUTPATIENT)
Dept: PHYSICAL THERAPY | Age: 82
End: 2022-09-12
Payer: MEDICARE

## 2022-09-14 ENCOUNTER — HOSPITAL ENCOUNTER (OUTPATIENT)
Dept: PHYSICAL THERAPY | Age: 82
Discharge: HOME | End: 2022-09-14
Payer: MEDICARE

## 2022-09-14 PROCEDURE — 97530 THERAPEUTIC ACTIVITIES: CPT

## 2022-09-14 PROCEDURE — 97112 NEUROMUSCULAR REEDUCATION: CPT

## 2022-09-14 PROCEDURE — 97110 THERAPEUTIC EXERCISES: CPT

## 2022-09-14 NOTE — PROGRESS NOTES
PHYSICAL THERAPY - DAILY TREATMENT NOTE    Patient Name: Apurva Hopper        Date: 2022  : 1940   YES Patient  Verified  Visit #:   4   of   4  Insurance: Payor: Matt Rich / Plan: [address] [place] [address] HMO / Product Type: Managed Care Medicare /      In time: 8:02 Out time: 8:48   Total Treatment Time: 46     Medicare/BCBS [place] Time Tracking (below)   Total Timed Codes (min):  46 1:1 Treatment Time:  46     TREATMENT AREA =  Gait instability [R26.81]  SUBJECTIVE    Pain Level (on 0 to 10 scale):  6  / 10   Medication Changes/New allergies or changes in medical history, any new surgeries or procedures? NO    If yes, update Summary List   Subjective Functional Status/Changes:  []  No changes reported     Pt reports 6/10 B groin pain, reports that she has this pain intermittently. Pt reports that she feels that therapy has helped, but that she feels that she can continue with her exercises on her own at this point. Pt reports that she has noticed that she is not drifting anymore.        OBJECTIVE  12 min Therapeutic Exercise:  [x]  See flow sheet (NS, HEP review)   Rationale:      increase ROM, increase strength, improve coordination, improve balance, and increase proprioception to improve the patients ability to perform ADLs/IADLs, functional mobility and gait safely and independently without increased pain/symptoms     22 min Therapeutic Activity: FOTO, FTSST, 10-meter walk test, FGA   Rationale: assess functional mobility and gait to improve the patient's ability to perform ADLs/IADLs, functional mobility and gait safely and independently without increased pain/symptoms      12 min Neuromuscular Re-ed: EO/EC balance on floor/foam, VOR x 1 standing on foam, HEP review   Rationale: improve balance to improve the patient's ability to perform ADLs/IADLs, functional mobility and gait safely and independently without increased pain/symptoms      Rationale:      During TE/NM min Patient Education:  YES  Reviewed HEP   []  Progressed/Changed HEP based on:   Cont HEP; added sit to stand; updated balance ex (copy in chart); issued DC instructions     Other Objective/Functional Measures:    FOTO = 55/100    FTSST = 18\"    10-meter walk test = 6\" (1.0 m/s)    FGA = 24/30    SR EO = 30\"B    Access Code: [email or web address]  URL: [email or web address]  Date: 09/14/2022  Prepared by: Elsie Espino    Exercises  Sit to Stand with Arms Crossed - 1 x daily - 7 x weekly - 1 sets - 10 reps     Post Treatment Pain Level (on 0 to 10) scale:   2  / 10     ASSESSMENT    Assessment/Changes in Function:     See discharge note           Progress toward goals / Updated goals:    See discharge note     PLAN    []  Upgrade activities as tolerated NO Continue plan of care   [x]  Discharge due to : Patient requests DC with HEP; progressing toward goals   []  Other:      Therapist: Elsie Espino PT    Date: 9/14/2022 Time: 8:02 AM     Future Appointments   Date Time Provider Orlin Ferrara   9/21/2022  8:00 AM Aureliano Patton PT [facility]   9/28/2022  8:45 AM Aureliano Patton PT [facility]

## 2022-09-14 NOTE — PROGRESS NOTES
[address] [place] [address] PHYSICAL THERAPY  [address] [place] [address] Rosalie Romberg [place], [address] - Phone: [phone]  Fax: [phone] SUMMARY FOR PHYSICAL THERAPY          Patient Name: Bennett Brooks : 1940   Treatment/Medical Diagnosis: Gait instability [R26.81]   Onset Date:       Referral Source: Cornelio Aggarwal MD [facility]): 2022   Prior Hospitalization: See Medical History Provider #: [id number]   Prior Level of Function: Independent with ADLs, ambulation   Comorbidities: Osteoporosis, arthritis, HTN, vertigo   Medications: Verified on Patient Summary List   Visits from [facility]: 4 Missed Visits: 0     Goal/Measure of Progress Goal Met? 1. Patient will demonstrate independence with HEP. Status at last Eval: NA Current Status: Independent with HEP yes   2. Patient will score greater than or equal to 22/30 on FGA to indicate improved balance/decreased fall risk. Status at last Eval:  Current Status: 24/30 yes   3. Patient will maintain foam stance eyes closed 30\" to increase safety in challenging environments. Status at last Eval: 2\" Current Status: 30\" yes   4. Patient will complete FTSST in less than or equal to 15\" to indicate improved transfers. Status at last Eval: 19\" Current Status: 18\" progressing     5. Patient will score greater than or equal to 56/100 on FOTO to indicate improved function. Status at last Eval: 52/100 Current Status: 55/100 progressing     Key Functional Changes/Progress: Patient has progressed with exercises and demonstrates independence with HEP. Patient states that she feels comfortable continuing with with exercises independently. Patient reports that she feels much better and no longer drifts when walking. FOTO has improved from 52/100 to 55/100, indicating improved overall function. Five Time Sit to Stand Test has decreased from 19\" to 18\".   Gait speed as measured by 10-meter walk test has improved from 0.75 m/s (limited community ambulation) to 1.0 m/s (community ambulation). Functional Gait Assessment has increased from 19/30 to 24/30, indicating decreased fall risk. Patient able to maintain sharpened Romberg eyes open 30\"B, modified sharpened Romberg with heel at bunion eyes closed 30\"B, modified sharpened Romberg with heel at great toe on foam eyes open and modified sharpened Romberg with heel at instep on foam eyes closed 30\"B. Assessments/Recommendations: Discontinue therapy due to lack of appreciable progress towards goals. If you have any questions/comments please contact us directly at [phone]. Thank you for allowing us to assist in the care of your patient. Therapist Signature: Patricia Espino PT Date: 9/14/2022   Reporting Period: 8/25/2022-9/14/2022 Time: 9:01 AM     NOTE TO PHYSICIAN:  PLEASE COMPLETE THE ORDERS BELOW AND FAX TO   [facility]: [phone]. If you are unable to process this request in 24 hours please contact our office: [phone].    ___ I have read the above report and request that my patient be discharged from therapy.      Physician Signature:        Date:______Time:

## 2022-09-19 ENCOUNTER — APPOINTMENT (OUTPATIENT)
Dept: PHYSICAL THERAPY | Age: 82
End: 2022-09-19
Payer: MEDICARE

## 2022-09-21 ENCOUNTER — APPOINTMENT (OUTPATIENT)
Dept: PHYSICAL THERAPY | Age: 82
End: 2022-09-21
Payer: MEDICARE

## 2022-09-26 ENCOUNTER — APPOINTMENT (OUTPATIENT)
Dept: PHYSICAL THERAPY | Age: 82
End: 2022-09-26
Payer: MEDICARE

## 2022-09-28 ENCOUNTER — APPOINTMENT (OUTPATIENT)
Dept: PHYSICAL THERAPY | Age: 82
End: 2022-09-28
Payer: MEDICARE

## (undated) DEVICE — SYR 50ML SLIP TIP NSAF LF STRL --

## (undated) DEVICE — FLEX ADVANTAGE 1500CC: Brand: FLEX ADVANTAGE

## (undated) DEVICE — MEDI-VAC NON-CONDUCTIVE SUCTION TUBING: Brand: CARDINAL HEALTH

## (undated) DEVICE — KIT COLON W/ 1.1OZ LUB AND 2 END

## (undated) DEVICE — KENDALL 500 SERIES DIAPHORETIC FOAM MONITORING ELECTRODE - TEAR DROP SHAPE ( 30/PK): Brand: KENDALL

## (undated) DEVICE — DEVICE INFL 60ML 12ATM CONVENIENT LOK REL HNDL HI PRSS FLX

## (undated) DEVICE — BASIN EMESIS 500CC ROSE 250/CS 60/PLT: Brand: MEDEGEN MEDICAL PRODUCTS, LLC